# Patient Record
Sex: FEMALE | Race: BLACK OR AFRICAN AMERICAN | Employment: FULL TIME | URBAN - METROPOLITAN AREA
[De-identification: names, ages, dates, MRNs, and addresses within clinical notes are randomized per-mention and may not be internally consistent; named-entity substitution may affect disease eponyms.]

---

## 2017-01-05 ENCOUNTER — HOSPITAL ENCOUNTER (OUTPATIENT)
Dept: ULTRASOUND IMAGING | Age: 47
Discharge: HOME | End: 2017-01-05
Attending: NURSE PRACTITIONER
Payer: SELF-PAY

## 2017-01-05 ENCOUNTER — OFFICE VISIT (OUTPATIENT)
Dept: FAMILY PLANNING/WOMEN'S HEALTH CLINIC | Age: 47
End: 2017-01-05

## 2017-01-05 ENCOUNTER — HOSPITAL ENCOUNTER (OUTPATIENT)
Dept: MAMMOGRAPHY | Age: 47
Discharge: HOME | End: 2017-01-05
Attending: NURSE PRACTITIONER
Payer: SELF-PAY

## 2017-01-05 VITALS — SYSTOLIC BLOOD PRESSURE: 142 MMHG | DIASTOLIC BLOOD PRESSURE: 81 MMHG

## 2017-01-05 DIAGNOSIS — Z12.31 VISIT FOR SCREENING MAMMOGRAM: ICD-10-CM

## 2017-01-05 DIAGNOSIS — N63.20 LEFT BREAST MASS: ICD-10-CM

## 2017-01-05 DIAGNOSIS — Z12.31 ENCOUNTER FOR SCREENING MAMMOGRAM FOR BREAST CANCER: Primary | ICD-10-CM

## 2017-01-05 PROCEDURE — 76642 ULTRASOUND BREAST LIMITED: CPT

## 2017-01-05 PROCEDURE — 77066 DX MAMMO INCL CAD BI: CPT

## 2017-01-05 NOTE — PROGRESS NOTES
EVERY WOMANS LIFE HISTORY QUESTIONNAIRE       No Yes Comments   Has a doctor ever seen or felt anything wrong with your breast? []                                  [x]                                  Seen in ER recently ([facility]) and felt area around reduction scars   Have you ever had a breast biopsy? [x]                                  []                                          When and where was last mammogram performed? 2013 at [facility]    Have you ever been told that there was a problem on your mammogram?   No Yes Comments   [x]                                  []                                       Do you have breast implants? No Yes Comments   [x]                                  []                                  Has had reduction     When was your last Pap test performed? 2013    Have you ever been diagnosed with any type of Cancer   No Yes Comments (type,when,where,type of treatment   []                                  [x]                                  Cervical--had hysterectomy with chemo and radiation  2010        Has a family member been diagnosed with breast or ovarian cancer? No Yes Comments (which family members, and type   [x]                                  []                                         Have you been through menopause? No Yes Date of LMP   []                                  [x]                                  S/p hysterectomy     Are you taking hormone replacement therapy (HRT)     No Yes Comments   [x]                                  []                                       How many times have you been pregnant? 4    Number of live births ? 4  Are you experiencing any of the following? No Yes Comments   Nipple Discharge [x]                                  []                                     Breast Lump/Masses []                                  [x]                                  ?? Area around L.  Breast reduction scars   Breast Skin Changes [x] []                                         Any other health problems? Was having chest pain, did cardiac workup recently that was nl    List of Pap Providers given to pt?     Yes, also gave names of Dr. Tim Pike and Dr. Hermon Hodgkins for [facility]

## 2017-01-08 PROBLEM — Z12.31 ENCOUNTER FOR SCREENING MAMMOGRAM FOR BREAST CANCER: Status: ACTIVE | Noted: 2017-01-08

## 2018-02-09 ENCOUNTER — OFFICE VISIT (OUTPATIENT)
Dept: FAMILY PLANNING/WOMEN'S HEALTH CLINIC | Age: 48
End: 2018-02-09

## 2018-02-09 ENCOUNTER — HOSPITAL ENCOUNTER (OUTPATIENT)
Dept: MAMMOGRAPHY | Age: 48
Discharge: HOME | End: 2018-02-09

## 2018-02-09 ENCOUNTER — HOSPITAL ENCOUNTER (OUTPATIENT)
Dept: LAB | Age: 48
Discharge: HOME | End: 2018-02-09

## 2018-02-09 VITALS — SYSTOLIC BLOOD PRESSURE: 129 MMHG | DIASTOLIC BLOOD PRESSURE: 79 MMHG

## 2018-02-09 DIAGNOSIS — Z01.419 WELL WOMAN EXAM WITH ROUTINE GYNECOLOGICAL EXAM: Primary | ICD-10-CM

## 2018-02-09 DIAGNOSIS — Z85.41 HISTORY OF CERVICAL CANCER IN ADULTHOOD: ICD-10-CM

## 2018-02-09 DIAGNOSIS — Z12.39 SCREENING BREAST EXAMINATION: ICD-10-CM

## 2018-02-09 PROCEDURE — 88175 CYTOPATH C/V AUTO FLUID REDO: CPT | Performed by: PHYSICIAN ASSISTANT

## 2018-02-09 PROCEDURE — 77067 SCR MAMMO BI INCL CAD: CPT

## 2018-02-09 NOTE — PROGRESS NOTES
EVERY WOMANS LIFE HISTORY QUESTIONNAIRE       No Yes Comments   Has a doctor ever seen or felt anything wrong with your breast? [x]                                  []                                     Have you ever had a breast biopsy? [x]                                  []                                          When and where was last mammogram performed? 1/2017    Have you ever been told that there was a problem on your mammogram?   No Yes Comments   [x]                                  []                                       Do you have breast implants? No Yes Comments   [x]                                  []                                       When was your last Pap test performed? 2010    Have you ever been diagnosed with any type of Cancer   No Yes Comments (type,when,where,type of treatment   []                                  [x]                                  Cervical 2010        Has a family member been diagnosed with breast or ovarian cancer? No Yes Comments (which family members, and type   []                                  [x]                                  Aunt and grandmother - breast- paternal       Have you been through menopause? No Yes Date of LMP   []                                  [x]                                  2010     Are you taking hormone replacement therapy (HRT)     No Yes Comments   [x]                                  []                                       How many times have you been pregnant? 5     Number of live births ? 4    Are you experiencing any of the following? No Yes Comments   Nipple Discharge [x]                                  []                                     Breast Lump/Masses [x]                                  []                                     Breast Skin Changes [x]                                  []                                         Any other health problems?  none      List of Pap Providers given to pt? n/a

## 2018-02-09 NOTE — PROGRESS NOTES
Assessment/Plan:    Diagnoses and all orders for this visit:    1. Well woman exam with routine gynecological exam  -     PAP IG, RFX APTIMA HPV ASCUS ([id number]))    2. History of cervical cancer in adulthood        Follow-up Disposition: Not on File    [facility] understanding of this plan. An AVS was printed and given to the patient.      ----------------------------------------------------------------------    Chief Complaint   Patient presents with    Well Woman     EWL visit       History of Present Illness:   all   Here for women's health exam  Last pelvic exam was during her kidney operation several years ago- was told that all looked well (had a kidney removed)  Does not think that she has ever had a pap test since her hysterectomy in  for cervical cancer. The details of the cancer are not well known but she did have chemotherapy after the surgery. She had had a history of abnl pap tests but always the colposcopy testing was reassuring. She eventually developed abnormal vaginal bleeding which led to the diagnosis of cervical cancer and within a week of the dx she had the hysterectomy    She is not having any vaginal complaints  She is in a safe stable long term relationship  She had breast reduction surgery and she developed an infection in her left breast that has left her left breast with a deformity- on exam it appears that there is a retraction present but she assures me that this is due to the infection        No past medical history on file.         No Known Allergies    Social History   Substance Use Topics    Smoking status: Never Smoker    Smokeless tobacco: Never Used    Alcohol use None       Family History   Problem Relation Age of Onset    Breast Cancer Paternal Grandmother 61    Breast Cancer Paternal Aunt 48       Physical Exam:     Visit Vitals    /79    LMP Comment:        A&Ox3  WDWN NAD  Respirations normal and non labored  Breasts right neg for dimpling, skin color changes, retractions or masses  Left breast has deformity to the left and superior of nipple with skin inversion. Otherwise her breast has no masses, skin color changes, dimpling or tenderness  Pelvic exam- ext neg for lesions or discharge. Vaginal cuff intact no cervix. Uterus is surgically absent.  Adnexa are not felt,  body habitus proves challenging

## 2018-02-14 ENCOUNTER — DOCUMENTATION ONLY (OUTPATIENT)
Dept: FAMILY PLANNING/WOMEN'S HEALTH CLINIC | Age: 48
End: 2018-02-14

## 2019-06-05 ENCOUNTER — OP HISTORICAL/CONVERTED ENCOUNTER (OUTPATIENT)
Dept: OTHER | Age: 49
End: 2019-06-05

## 2019-09-25 PROBLEM — Z12.31 ENCOUNTER FOR SCREENING MAMMOGRAM FOR BREAST CANCER: Status: RESOLVED | Noted: 2017-01-08 | Resolved: 2019-09-25

## 2020-06-22 ENCOUNTER — OP HISTORICAL/CONVERTED ENCOUNTER (OUTPATIENT)
Dept: OTHER | Age: 50
End: 2020-06-22

## 2020-06-22 LAB — MAMMOGRAPHY, EXTERNAL: NORMAL

## 2020-06-23 ENCOUNTER — OP HISTORICAL/CONVERTED ENCOUNTER (OUTPATIENT)
Dept: OTHER | Age: 50
End: 2020-06-23

## 2020-06-23 LAB — C-REACTIVE PROTEIN, EXTERNAL: NORMAL

## 2020-06-25 LAB
A/G RATIO, EXTERNAL: 1.3
A/G RATIO, EXTERNAL: 1.3
ALBUMIN, EXTERNAL: 4
ALBUMIN, EXTERNAL: 4
ALK PHOS, EXTERNAL: 52
ALK PHOS, EXTERNAL: 52
ANION GAP BLD CALC-SCNC: NORMAL MMOL/L
ANION GAP BLD CALC-SCNC: NORMAL MMOL/L
BILI DIRECT, EXTERNAL: NORMAL
BILI DIRECT, EXTERNAL: NORMAL
BILI TOTAL, EXTERNAL: 0.8
BILI TOTAL, EXTERNAL: 0.8
BUN BLD-MCNC: 8 MG/DL
BUN BLD-MCNC: 8 MG/DL
BUN/CREATININE RATIO, EXTERNAL: 8
BUN/CREATININE RATIO, EXTERNAL: 8
CALCIUM, EXTERNAL: 9.8
CALCIUM, EXTERNAL: 9.8
CALCIUM, ION, EXTERNAL: NORMAL
CALCIUM, ION, EXTERNAL: NORMAL
CHLORIDE, EXTERNAL: 104
CHLORIDE, EXTERNAL: 104
CO2, EXTERNAL: 23
CO2, EXTERNAL: 23
CREATININE SER, EXTERNAL: 0.97
CREATININE SER, EXTERNAL: 0.97
EGFR IF AFA, EXTERNAL: 80
EGFR IF AFA, EXTERNAL: 80
EGFR NOT AFA, EXTERNAL: 69
EGFR NOT AFA, EXTERNAL: 69
GLOBULIN, EXTERNAL: 3
GLOBULIN, EXTERNAL: 3
GLUCOSE SER, EXTERNAL: 89
GLUCOSE SER, EXTERNAL: 89
POTASSIUM, EXTERNAL: 4
POTASSIUM, EXTERNAL: 4.3
PROTEIN TOT, EXTERNAL: 7
PROTEIN TOT, EXTERNAL: 7
SGOT (AST), EXTERNAL: 15
SGOT (AST), EXTERNAL: 15
SGPT (ALT), EXTERNAL: 16
SGPT (ALT), EXTERNAL: 16
SODIUM, EXTERNAL: 136
SODIUM, EXTERNAL: 136

## 2020-08-05 VITALS
HEART RATE: 84 BPM | WEIGHT: 249 LBS | OXYGEN SATURATION: 96 % | HEIGHT: 65 IN | TEMPERATURE: 97.9 F | SYSTOLIC BLOOD PRESSURE: 133 MMHG | BODY MASS INDEX: 41.48 KG/M2 | DIASTOLIC BLOOD PRESSURE: 77 MMHG

## 2020-08-05 PROBLEM — C53.9 MALIGNANT TUMOR OF CERVIX (HCC): Status: ACTIVE | Noted: 2020-08-05

## 2020-08-05 PROBLEM — E04.9 GOITER: Status: ACTIVE | Noted: 2020-08-05

## 2020-08-05 PROBLEM — G47.00 INSOMNIA: Status: ACTIVE | Noted: 2020-08-05

## 2020-08-05 RX ORDER — PHENTERMINE HYDROCHLORIDE 37.5 MG/1
37.5 TABLET ORAL
COMMUNITY
End: 2021-01-07 | Stop reason: SDUPTHER

## 2020-08-05 RX ORDER — ALBUTEROL SULFATE 90 UG/1
2 AEROSOL, METERED RESPIRATORY (INHALATION)
COMMUNITY

## 2020-08-07 ENCOUNTER — VIRTUAL VISIT (OUTPATIENT)
Dept: FAMILY MEDICINE CLINIC | Age: 50
End: 2020-08-07
Payer: COMMERCIAL

## 2020-08-07 DIAGNOSIS — J06.9 UPPER RESPIRATORY TRACT INFECTION, UNSPECIFIED TYPE: ICD-10-CM

## 2020-08-07 DIAGNOSIS — L04.9 LYMPHADENITIS, ACUTE: Primary | ICD-10-CM

## 2020-08-07 PROCEDURE — 99213 OFFICE O/P EST LOW 20 MIN: CPT | Performed by: FAMILY MEDICINE

## 2020-08-07 RX ORDER — AMOXICILLIN AND CLAVULANATE POTASSIUM 875; 125 MG/1; MG/1
1 TABLET, FILM COATED ORAL EVERY 12 HOURS
Qty: 20 TAB | Refills: 0 | Status: SHIPPED | OUTPATIENT
Start: 2020-08-07 | End: 2020-08-17

## 2020-08-07 NOTE — PATIENT INSTRUCTIONS
General Health and concerns: HEART HEALTHY DIET: 
A heart healthy diet is one that is low in cholesterol (less than 300 mg daily), fat (less than 80 g daily) . You should also minimize carbohydrates / sugars (less amounts of breads, pastas, potato and potato products and sugary foods/snacks, cookies, cakes, etc) . Try to eat whole wheat/multigrain breads and pastas and eat more vegetables. Cook with olive oil (or no oil) and grill, bake, broil or boil foods. Less red meat and more chicken , fish and lean cuts of beef (limited). 7549-0209 calories per day is sufficient 5103-8136 is acceptable for weight loss. EXERCISE: 
You should do exercise 3-5 days per week (minimum) to include increasing your heart rate for 30 to 45 minutes. At least a pace of a brisk walk should do that. This build up your heart and lung endurance and muscles and helps many function of the body. OTHER: 
    Routine Health maintenance: You need to get a yearly follow up/physical exam to review, discuss age and gender appropriate exams, labs, vaccines and screening tests. This includes cardiovascular health risk, cancer screens and other bettina related topics. Medications-take all medications as directed. Please do not stop unless you talk to your doctor or health care provider first. Report any problems immediately. Referrals: if you have been given a referral, please call the office if you do not hear from provider in one week. You may make the appointment yourself. Please keep all appointments with specialists and ask them to send their notes, thoughts, recommendations to us , as your PCP. Imaging/Labs:  Be sure to get these images in a timely manner. IF your test must be scheduled, let us know if you need help getting this done and if you do not hear from that provider in a week , call us or them.   BE SURE to call the office if you do not hear regarding the results in one week after the test is performed Image or lab). It is our intention to inform you of the results ALWAYS, even if normal you should get a notification (Call, portal message). PLEASE mary if you do not get the results. PLEASE follow all recommendations and call/come in /ask questions if you do not understand of if problems develop after or in between visits. Failure to comply with recommended health care advise could result in serious health consequences. Thank you for choosing our practice and please let us know how we can help you feel better and stay well!

## 2020-08-17 VITALS
DIASTOLIC BLOOD PRESSURE: 77 MMHG | SYSTOLIC BLOOD PRESSURE: 133 MMHG | OXYGEN SATURATION: 96 % | TEMPERATURE: 97.9 F | BODY MASS INDEX: 41.48 KG/M2 | HEIGHT: 65 IN | WEIGHT: 249 LBS | HEART RATE: 84 BPM

## 2021-01-07 DIAGNOSIS — E66.09 CLASS 1 OBESITY DUE TO EXCESS CALORIES WITH SERIOUS COMORBIDITY AND BODY MASS INDEX (BMI) OF 30.0 TO 30.9 IN ADULT: Primary | ICD-10-CM

## 2021-01-11 RX ORDER — PHENTERMINE HYDROCHLORIDE 37.5 MG/1
37.5 TABLET ORAL
Qty: 30 TAB | Refills: 3 | Status: SHIPPED | OUTPATIENT
Start: 2021-01-11 | End: 2021-07-28 | Stop reason: ALTCHOICE

## 2021-06-28 ENCOUNTER — TRANSCRIBE ORDER (OUTPATIENT)
Dept: SCHEDULING | Age: 51
End: 2021-06-28

## 2021-06-28 DIAGNOSIS — Z12.31 VISIT FOR SCREENING MAMMOGRAM: Primary | ICD-10-CM

## 2021-06-30 ENCOUNTER — HOSPITAL ENCOUNTER (OUTPATIENT)
Dept: MAMMOGRAPHY | Age: 51
Discharge: HOME | End: 2021-06-30
Payer: COMMERCIAL

## 2021-06-30 DIAGNOSIS — Z12.31 VISIT FOR SCREENING MAMMOGRAM: ICD-10-CM

## 2021-06-30 PROCEDURE — 77067 SCR MAMMO BI INCL CAD: CPT

## 2021-07-28 ENCOUNTER — OFFICE VISIT (OUTPATIENT)
Dept: FAMILY MEDICINE CLINIC | Age: 51
End: 2021-07-28
Payer: COMMERCIAL

## 2021-07-28 VITALS
WEIGHT: 242 LBS | OXYGEN SATURATION: 96 % | HEART RATE: 72 BPM | DIASTOLIC BLOOD PRESSURE: 93 MMHG | BODY MASS INDEX: 40.32 KG/M2 | SYSTOLIC BLOOD PRESSURE: 142 MMHG | TEMPERATURE: 97.1 F | HEIGHT: 65 IN

## 2021-07-28 DIAGNOSIS — Z00.00 ENCOUNTER FOR ROUTINE ADULT HEALTH EXAMINATION WITHOUT ABNORMAL FINDINGS: Primary | ICD-10-CM

## 2021-07-28 DIAGNOSIS — Z12.11 SCREENING FOR COLON CANCER: ICD-10-CM

## 2021-07-28 DIAGNOSIS — E04.9 GOITER: ICD-10-CM

## 2021-07-28 PROCEDURE — 99396 PREV VISIT EST AGE 40-64: CPT | Performed by: FAMILY MEDICINE

## 2021-07-28 RX ORDER — METHOTREXATE 2.5 MG/1
TABLET ORAL
COMMUNITY
Start: 2021-05-14 | End: 2021-08-27 | Stop reason: ALTCHOICE

## 2021-07-28 RX ORDER — FOLIC ACID 1 MG/1
TABLET ORAL
COMMUNITY
Start: 2021-06-24 | End: 2021-08-27 | Stop reason: ALTCHOICE

## 2021-07-28 NOTE — PROGRESS NOTES
IDENTIFYING INFORMATION:      Fabi Cruz , 54 y.o., female  [address] [facility],  [address] [place] [address]     Medical Record Number: [id number]          CHIEF COMPLAINT:     Chief Complaint   Patient presents with    Physical     states that she is here for a check up. HISTORY OF PRESENT ILLNESS:    Ariel Bourgeois is a 54 y.o. female  has a past medical history of Goiter (8/5/2020), Insomnia (8/5/2020), and Malignant tumor of cervix ([facility] [address] (8/5/2020). .  she comes in for follow-up yearly. She has a history of a goiter and has no swallowing issues. She has some arthritis and has been seeing rheumatology. She takes methotrexate and folic acid now. She has no major problems such as fatigue hematochezia or melena. She complains of no chest pain, short of breath, orthopnea or PND. She still occasionally has headaches. She has had a mammogram recently and it was normal.  She sees gynecology for her Pap smear testing. She does need some routine labs and will consider getting a colonoscopy because of her age. There is no personal family history of colon cancer. PAST MEDICAL HISTORY:     Past Medical History:   Diagnosis Date    Goiter 8/5/2020    Insomnia 8/5/2020    Malignant tumor of cervix ([facility] [address] 8/5/2020       MEDICATIONS:     Current Outpatient Medications on File Prior to Visit   Medication Sig Dispense Refill    methotrexate (RHEUMATREX) 2.5 mg tablet TAKE 6 TABLETS BY MOUTH 1 TIME A WEEK      folic acid (FOLVITE) 1 mg tablet TAKE 1 TABLET BY MOUTH EVERY DAY      albuterol (PROVENTIL HFA, VENTOLIN HFA, PROAIR HFA) 90 mcg/actuation inhaler Take 2 Puffs by inhalation every four (4) hours as needed for Wheezing.  [DISCONTINUED] phentermine (ADIPEX-P) 37.5 mg tablet Take 1 Tab by mouth every morning. Max Daily Amount: 37.5 mg. (Patient not taking: Reported on 7/28/2021) 30 Tab 3    [DISCONTINUED] fluticasone prp-sod. chl,bicarb 50 mcg- 0.9 % ksps 2 Sprays by Nasal route. (Patient not taking: Reported on 7/28/2021)       No current facility-administered medications on file prior to visit. ALLERGIES:    No Known Allergies      SOCIAL HISTORY:     Social History     Tobacco Use    Smoking status: Never Smoker    Smokeless tobacco: Never Used   Substance Use Topics    Alcohol use: Not Currently    Drug use: Not on file       SURGICAL HISTORY:    Past Surgical History:   Procedure Laterality Date    HX BREAST REDUCTION Bilateral 2006    HX HYSTERECTOMY      HX NEPHRECTOMY Right 2012        FAMILY HISTORY:    Family History   Problem Relation Age of Onset    Breast Cancer Paternal Grandmother 61    Cancer Paternal Grandmother         breast    Breast Cancer Paternal Aunt 48    Hypertension Mother     Hypertension Father     Diabetes Sister     Cancer Maternal Grandmother         lung         REVIEW OF SYSTEMS:    I personally collected this information from all available source present (patient/others in room and records available) -[email or web address]    Review of Systems   Constitutional: Negative for chills, diaphoresis, fever and weight loss. HENT: Positive for tinnitus. Negative for congestion, ear pain, hearing loss, nosebleeds, sinus pain and sore throat. Eyes: Negative for blurred vision, double vision, photophobia and pain. Respiratory: Negative for cough, sputum production and shortness of breath. Cardiovascular: Negative for chest pain, palpitations, orthopnea, claudication, leg swelling and PND. Gastrointestinal: Negative for abdominal pain, blood in stool, constipation, diarrhea, heartburn, melena, nausea and vomiting. Genitourinary: Negative for dysuria, frequency and urgency. Musculoskeletal: Negative for back pain, joint pain, myalgias and neck pain. Skin: Negative for itching and rash. Neurological: Positive for dizziness. Negative for tingling, sensory change, focal weakness and headaches.    Psychiatric/Behavioral: Negative for depression and suicidal ideas. The patient is not nervous/anxious and does not have insomnia. PHYSICAL EXAMINATION:    Vital Signs:    Visit Vitals  BP (!) 142/93 (BP 1 Location: Left upper arm, BP Patient Position: Sitting)   Pulse 72   Temp 97.1 °F (36.2 °C) (Temporal)   Ht 5' 5\" (1.651 m)   Wt 242 lb (109.8 kg)   SpO2 96%   BMI 40.27 kg/m²         Wt Readings from Last 3 Encounters:   07/28/21 242 lb (109.8 kg)   11/26/19 249 lb (112.9 kg)   11/26/19 249 lb (112.9 kg)     BP Readings from Last 3 Encounters:   07/28/21 (!) 142/93   11/26/19 133/77   11/26/19 133/77         Physical Exam  Nursing note reviewed. Constitutional:       General: She is not in acute distress. Appearance: Normal appearance. She is obese. She is not ill-appearing or toxic-appearing. HENT:      Right Ear: Tympanic membrane, ear canal and external ear normal.      Left Ear: Tympanic membrane, ear canal and external ear normal.      Nose: No congestion or rhinorrhea. Mouth/Throat:      Pharynx: No oropharyngeal exudate or posterior oropharyngeal erythema. Eyes:      General: No scleral icterus. Extraocular Movements: Extraocular movements intact. Conjunctiva/sclera: Conjunctivae normal.      Pupils: Pupils are equal, round, and reactive to light. Neck:      Vascular: No carotid bruit. Cardiovascular:      Rate and Rhythm: Normal rate and regular rhythm. Heart sounds: No murmur heard. No friction rub. No gallop. Pulmonary:      Effort: Pulmonary effort is normal.      Breath sounds: Normal breath sounds. No wheezing, rhonchi or rales. Abdominal:      General: Bowel sounds are normal. There is no distension. Palpations: Abdomen is soft. There is no mass. Tenderness: There is no abdominal tenderness. Musculoskeletal:         General: No swelling, tenderness or deformity. Cervical back: No rigidity. No muscular tenderness. Right lower leg: No edema. Left lower leg: No edema. Lymphadenopathy:      Cervical: No cervical adenopathy. Skin:     Capillary Refill: Capillary refill takes less than 2 seconds. Coloration: Skin is not jaundiced. Findings: No erythema or rash. Neurological:      General: No focal deficit present. Mental Status: She is alert and oriented to person, place, and time. Mental status is at baseline. Psychiatric:         Mood and Affect: Mood normal.         Behavior: Behavior normal.         Thought Content: Thought content normal.         Judgment: Judgment normal.           ASSESSMENT/PLAN:      Discussion (regarding today's visit with Jaja Cueto); The patient and I discussed all age and gender appropriate screening exams. Risk of non compliance discussed including missing early, treatable and possibly curable serious health issues. Labs, imaging and vaccinations as well as any other pertinent testing was ordered if appropriate. ICD-10-CM ICD-9-CM    1. Encounter for routine adult health examination without abnormal findings  Z00.00 V70.0 CBC WITH AUTOMATED DIFF      LIPID PANEL      METABOLIC PANEL, COMPREHENSIVE      URINALYSIS W/ RFLX MICROSCOPIC   2. Goiter  E04.9 240.9 TSH 3RD GENERATION   3. Screening for colon cancer  Z12.11 V76.51 REFERRAL TO GASTROENTEROLOGY      WE reviewed each diagnosis listed for today's visit.  WE reviewed medications, treatment, testing such as labs, imagine, referrals and when to call regarding results and appointments.  Reminded patient to keep any and all appointments with specialists, labs, imaging.  Reminded patient to make sure we get copies of any specialists care, labs and imaging.  Reminded patient to call of come by the office if there are any concerns, questions , comments or problems.  The patient verbalized understanding of the care plan and all questions were answered to the patient's satisfaction prior to leaving the office.     The patient was told that failure to comply with recommended testing could result in abnormal health consequences.  The patient was instructed to have yearly routine health maintenance including but not limited to age appropriate vaccines, testing, screening exams.  ALL questions were answered to her satisfaction before leaving the office. The patient actively participated in medical decision making. FOLLOW UP:     Patient knows to keep any and all future visits scheduled unless told otherwise. Patient knows to call, come back if any concerns, questions, comments or problems arise. Follow-up and Dispositions    · Return in about 4 months (around 11/28/2021) for follow up htn. Timothy Pham DO    This visit was reviewed and signed electronically. It was been completed with voice recognition software and hand typing. It may have syntax and spelling errors despite editing.

## 2021-07-28 NOTE — PATIENT INSTRUCTIONS
General Health and concerns:  HEART HEALTHY DIET:  A heart healthy diet is one that is low in cholesterol (less than 300 mg daily), fat (less than 80 g daily) . You should also minimize carbohydrates / sugars (less amounts of breads, pastas, potato and potato products and sugary foods/snacks, cookies, cakes, etc) . Try to eat whole wheat/multigrain breads and pastas and eat more vegetables. Cook with olive oil (or no oil) and grill, bake, broil or boil foods. Less red meat and more chicken , fish and lean cuts of beef (limited). 2663-0518 calories per day is sufficient 9717-2977 is acceptable for weight loss. EXERCISE:  You should do exercise 3-5 days per week (minimum) to include increasing your heart rate for 30 to 45 minutes. At least a pace of a brisk walk should do that. This build up your heart and lung endurance and muscles and helps many function of the body. OTHER:        Routine Health maintenance: You need to get a yearly follow up/physical exam to review, discuss age and gender appropriate exams, labs, vaccines and screening tests. This includes cardiovascular health risk, cancer screens and other bettina related topics. Medications-Take all medications as directed. Please do not stop unless you talk to your doctor or health care provider first. Report any problems immediately. Referrals: if you have been given a referral, please call the office if you do not hear from provider in one week. You may make the appointment yourself. Please keep all appointments with specialists and ask them to send their notes, thoughts, recommendations to us , as your PCP. Imaging/Labs:  Be sure to get these images in a timely manner. IF your test must be scheduled, let us know if you need help getting this done and if you do not hear from that provider in a week , call us or them.   BE SURE to call the office if you do not hear regarding the results in one week after the test is performed Image or lab). It is our intention to inform you of the results ALWAYS, even if normal you should get a notification (Call, portal message). PLEASE mary if you do not get the results. PLEASE follow all recommendations and call/come in /ask questions if you do not understand of if problems develop after or in between visits. Failure to comply with recommended health care advise could result in serious health consequences. Thank you for choosing our practice and please let us know how we can help you feel better and stay well!

## 2021-07-28 NOTE — PROGRESS NOTES
1. Have you been to the ER, urgent care clinic since your last visit? Hospitalized since your last visit? No    2. Have you seen or consulted any other health care providers outside of the [address] [place] [address] since your last visit? Include any pap smears or colon screening. Rheumatologist at [facility]    Chief Complaint   Patient presents with   [facility] Physical     states that she is here for a check up.       Visit Vitals  BP (!) 152/96 (BP 1 Location: Left upper arm, BP Patient Position: Sitting)   Pulse 79   Temp 97.1 °F (36.2 °C) (Temporal)   Ht 5' 5\" (1.651 m)   Wt 242 lb (109.8 kg)   SpO2 96%   BMI 40.27 kg/m²

## 2021-07-31 LAB
ALBUMIN SERPL-MCNC: 4.4 G/DL (ref 3.8–4.8)
ALBUMIN/GLOB SERPL: 1.5 {RATIO} (ref 1.2–2.2)
ALP SERPL-CCNC: 67 IU/L (ref 48–121)
ALT SERPL-CCNC: 14 IU/L (ref 0–32)
APPEARANCE UR: CLEAR
AST SERPL-CCNC: 13 IU/L (ref 0–40)
BASOPHILS # BLD AUTO: 0 X10E3/UL (ref 0–0.2)
BASOPHILS NFR BLD AUTO: 1 %
BILIRUB SERPL-MCNC: 0.4 MG/DL (ref 0–1.2)
BILIRUB UR QL STRIP: NEGATIVE
BUN SERPL-MCNC: 10 MG/DL (ref 6–24)
BUN/CREAT SERPL: 10 (ref 9–23)
CALCIUM SERPL-MCNC: 10.2 MG/DL (ref 8.7–10.2)
CHLORIDE SERPL-SCNC: 105 MMOL/L (ref 96–106)
CHOLEST SERPL-MCNC: 198 MG/DL (ref 100–199)
CO2 SERPL-SCNC: 22 MMOL/L (ref 20–29)
COLOR UR: YELLOW
CREAT SERPL-MCNC: 0.97 MG/DL (ref 0.57–1)
EOSINOPHIL # BLD AUTO: 0.1 X10E3/UL (ref 0–0.4)
EOSINOPHIL NFR BLD AUTO: 2 %
ERYTHROCYTE [DISTWIDTH] IN BLOOD BY AUTOMATED COUNT: 14.3 % (ref 11.7–15.4)
GLOBULIN SER CALC-MCNC: 2.9 G/DL (ref 1.5–4.5)
GLUCOSE SERPL-MCNC: 87 MG/DL (ref 65–99)
GLUCOSE UR QL: NEGATIVE
HCT VFR BLD AUTO: 43.9 % (ref 34–46.6)
HDLC SERPL-MCNC: 51 MG/DL
HGB BLD-MCNC: 14.8 G/DL (ref 11.1–15.9)
HGB UR QL STRIP: NEGATIVE
IMM GRANULOCYTES # BLD AUTO: 0 X10E3/UL (ref 0–0.1)
IMM GRANULOCYTES NFR BLD AUTO: 0 %
KETONES UR QL STRIP: NEGATIVE
LDLC SERPL CALC-MCNC: 114 MG/DL (ref 0–99)
LEUKOCYTE ESTERASE UR QL STRIP: NEGATIVE
LYMPHOCYTES # BLD AUTO: 2.2 X10E3/UL (ref 0.7–3.1)
LYMPHOCYTES NFR BLD AUTO: 40 %
MCH RBC QN AUTO: 31.3 PG (ref 26.6–33)
MCHC RBC AUTO-ENTMCNC: 33.7 G/DL (ref 31.5–35.7)
MCV RBC AUTO: 93 FL (ref 79–97)
MICRO URNS: NORMAL
MONOCYTES # BLD AUTO: 0.4 X10E3/UL (ref 0.1–0.9)
MONOCYTES NFR BLD AUTO: 6 %
NEUTROPHILS # BLD AUTO: 2.9 X10E3/UL (ref 1.4–7)
NEUTROPHILS NFR BLD AUTO: 51 %
NITRITE UR QL STRIP: NEGATIVE
PH UR STRIP: 5.5 [PH] (ref 5–7.5)
PLATELET # BLD AUTO: 277 X10E3/UL (ref 150–450)
POTASSIUM SERPL-SCNC: 4.2 MMOL/L (ref 3.5–5.2)
PROT SERPL-MCNC: 7.3 G/DL (ref 6–8.5)
PROT UR QL STRIP: NEGATIVE
RBC # BLD AUTO: 4.73 X10E6/UL (ref 3.77–5.28)
SODIUM SERPL-SCNC: 140 MMOL/L (ref 134–144)
SP GR UR: 1.02 (ref 1–1.03)
TRIGL SERPL-MCNC: 187 MG/DL (ref 0–149)
TSH SERPL DL<=0.005 MIU/L-ACNC: 0.27 UIU/ML (ref 0.45–4.5)
UROBILINOGEN UR STRIP-MCNC: 1 MG/DL (ref 0.2–1)
VLDLC SERPL CALC-MCNC: 33 MG/DL (ref 5–40)
WBC # BLD AUTO: 5.6 X10E3/UL (ref 3.4–10.8)

## 2021-08-02 NOTE — PROGRESS NOTES
Blood count is normal.  The thyroid is indicating his hypothyroidism. Therefore, I would see an endocrinologist.  I am not sure if that is related to the methotrexate or not. However, it is significant enough to not be ignored. Cholesterol is okay. The total is 198 which is good the LDL or bad cholesterol is 114. It should be less than 100. So as always, watch the amount of carbohydrates such as breads, pastas, potatoes and that the fried, breaded, greasy fatty foods.

## 2021-08-17 DIAGNOSIS — E05.90 HYPERTHYROIDISM: Primary | ICD-10-CM

## 2021-08-17 NOTE — PROGRESS NOTES
Identifying Data:  54 y.o. , Mike Russell , female     HISTORICAL DATA (REVIEWED TODAY):  ALLERGIES-    No Known Allergies    MEDICATION AS OF LAST RECONCILIATION (NOT INCLUDING CHANGES MADE TODAY):    Current Outpatient Medications on File Prior to Visit   Medication Sig Dispense Refill    methotrexate (RHEUMATREX) 2.5 mg tablet TAKE 6 TABLETS BY MOUTH 1 TIME A WEEK      folic acid (FOLVITE) 1 mg tablet TAKE 1 TABLET BY MOUTH EVERY DAY      albuterol (PROVENTIL HFA, VENTOLIN HFA, PROAIR HFA) 90 mcg/actuation inhaler Take 2 Puffs by inhalation every four (4) hours as needed for Wheezing. No current facility-administered medications on file prior to visit. PAST MEDICAL HISTORY:    Patient Active Problem List   Diagnosis Code    Goiter E04.9    Insomnia G47.00    Malignant tumor of cervix ([facility] [address] C53.9    Lymphadenitis, acute L04.9       ASSESSMENT AND PLAN:      ICD-10-CM ICD-9-CM    1.  Hyperthyroidism  E05.90 242.90 REFERRAL TO ENDOCRINOLOGY             Domenica Matthew DO

## 2021-08-27 ENCOUNTER — VIRTUAL VISIT (OUTPATIENT)
Dept: FAMILY MEDICINE CLINIC | Age: 51
End: 2021-08-27
Payer: MEDICAID

## 2021-08-27 DIAGNOSIS — I10 ESSENTIAL HYPERTENSION: ICD-10-CM

## 2021-08-27 DIAGNOSIS — M54.12 CERVICAL RADICULOPATHY: Primary | ICD-10-CM

## 2021-08-27 PROCEDURE — 99214 OFFICE O/P EST MOD 30 MIN: CPT | Performed by: FAMILY MEDICINE

## 2021-08-27 RX ORDER — AMITRIPTYLINE HYDROCHLORIDE 25 MG/1
TABLET, FILM COATED ORAL
Qty: 60 TABLET | Refills: 0 | Status: SHIPPED | OUTPATIENT
Start: 2021-08-27 | End: 2021-10-06

## 2021-08-27 RX ORDER — LOSARTAN POTASSIUM 50 MG/1
50 TABLET ORAL DAILY
Qty: 30 TABLET | Refills: 1 | Status: SHIPPED | OUTPATIENT
Start: 2021-08-27 | End: 2021-10-19

## 2021-08-27 NOTE — PROGRESS NOTES
IDENTIFICATION:  Jude Cruz 54 y.o. female     Medical Record Number: [id number]      This is a video visit performed with doxy. me due to COVID-19 Pandemic. It utilizes video and audio technology that allows real time interaction with the patient. It is documented in [facility] [address]  realizes that this is a billable charge and agrees to proceed. The patient's identity is confirmed. Their location is confirmed to be in the state where provider is licensed. CC (Chief Complaint):    Chief Complaint   Patient presents with    Hypertension         MEDICAL PROBLEM LIST (Past and Current):     Past Medical History:   Diagnosis Date    Goiter 8/5/2020    Insomnia 8/5/2020    Malignant tumor of cervix ([facility] [address] 8/5/2020         HPI(History of the Present Illness):    Terrance England is a 54 y.o. female   has a past medical history of Goiter (8/5/2020), Insomnia (8/5/2020), and Malignant tumor of cervix ([facility] [address] (8/5/2020). .  she asks for a virtual visit today due to elevated blood pressure. She has been to several physicians over the last week or so than her blood pressure is always up. 163/120 this morning. Day prior it was 183 of 114, 152 of 118, 166/103. She denies any headaches. She denies any nausea vomiting. She denies any blurry, double, altered vision no spots, floaters, vertigo, hearing loss. She has no chest pain or palpitations. She has no shortness of breath. She does have neck pain shoulder pain left arm pain and elbow pain. She is seen other providers and been diagnosed with cervical radiculopathy. She does have a history of rheumatoid arthritis also and was on methotrexate. However, she started to get a tremor and her rheumatologist stopped that. She is currently finishing up a Medrol Dosepak on day #6. She says it has not helped. She says she is not able to sleep. She wonders if the pain is causing her pressure to rise.     HISTORICAL DATA (Social, surgical and family histories) :    Social History     Tobacco Use    Smoking status: Never Smoker    Smokeless tobacco: Never Used   Substance Use Topics    Alcohol use: Not Currently    Drug use: Not on file     Past Surgical History:   Procedure Laterality Date    HX BREAST REDUCTION Bilateral 2006    HX HYSTERECTOMY      HX NEPHRECTOMY Right 2012     Family History   Problem Relation Age of Onset    Breast Cancer Paternal Grandmother 61    Cancer Paternal Grandmother         breast    Breast Cancer Paternal Aunt 48    Hypertension Mother     Hypertension Father     Diabetes Sister     Cancer Maternal Grandmother         lung       ALLERGIES AND MEDICATIONS:  Allergies-    No Known Allergies    Medications-    Current Outpatient Medications on File Prior to Visit   Medication Sig Dispense Refill    albuterol (PROVENTIL HFA, VENTOLIN HFA, PROAIR HFA) 90 mcg/actuation inhaler Take 2 Puffs by inhalation every four (4) hours as needed for Wheezing.  [DISCONTINUED] methotrexate (RHEUMATREX) 2.5 mg tablet TAKE 6 TABLETS BY MOUTH 1 TIME A WEEK      [DISCONTINUED] folic acid (FOLVITE) 1 mg tablet TAKE 1 TABLET BY MOUTH EVERY DAY       No current facility-administered medications on file prior to visit. REVIEW OF SYSTEMS (information comes from all available records and patient/family present with patient) :    PER HPI    VITAL SIGNS:    NO vitals signs reported by patient from current location this day. Wt Readings from Last 3 Encounters:   07/28/21 242 lb (109.8 kg)   11/26/19 249 lb (112.9 kg)   11/26/19 249 lb (112.9 kg)       BP Readings from Last 3 Encounters:   07/28/21 (!) 142/93   11/26/19 133/77   11/26/19 133/77         PHYSICAL EXAMINATION:     By  video and virtual technology we observed the following today;    General-she does not look to be in acute distress. She is alert.   She is pleasant and conversational.  Skin-visible areas on arms neck and face show no bruising, bleeding, rashes. Neck-range of motion looks normal.  Trachea looks midline. There are no masses noted. Throat-no raspy or hoarseness of voice or difficulty speaking. No stridor. Lungs-no audible wheezing or visible dyspnea. Neurological-areas of arms shoulders neck face show no focal motor asymmetries tremors or abnormal movements. Psych-mood is pleasant and behavior and appearance are normal.  Musculoskeletal-head neck and shoulders move normally with no gross deformities noted. ASSESSMENT AND PLAN:  Discussion regarding today's visit with Caitlin Cruz :       ICD-10-CM ICD-9-CM    1. Cervical radiculopathy  M54.12 723.4 amitriptyline (ELAVIL) 25 mg tablet   2. Essential hypertension  I10 401.9 losartan (COZAAR) 50 mg tablet     -She definitely has some cervical radiculopathy symptoms  -They seem to be causing her poor sleep.  -I will give her some Elavil at bedtime. She will start 25 mg daily for about 4 days and if needed go up to 50. She may stay on 25 if it helps  -I will start losartan 50 mg in the morning.  -I want to see her in a week to check her pressure and see how she is doing otherwise. Other Discussion-  The patient realizes; That this video visit does not replace nor is as accurate / reliable as a face to face visit with a medical professional/physician/provider and that all diagnoses are based on information given by patient /others representing patient if present and records available. Each diagnosis listed for today's visit including medications, treatment, testing such as labs, imagine, referrals and when to call regarding results and appointments. Reminded patient to keep any and all appointments with specialists, labs, imaging. Reminded patient to make sure we get copies of any specialists care, labs and imaging. Reminded patient to call of come by the office if there are any concerns, questions , comments or problems.     The patient verbalized understanding of the care plan and all questions were answered to the patient's satisfaction prior to leaving the office. The patient was told that failure to comply with recommended testing could result in abnormal health consequences. The patient was instructed to have yearly routine health maintenance including but not limited to age appropriate vaccines, testing, screening exams. The patient actively participated in medical decision making. FOLLOW UP:    Patient is advised to keep all future appointments unless otherwise discussed and keep ALL appointments for other providers, specialists and testing if scheduled. Failure to do so could result in adverse health consequences. Follow-up and Dispositions    · Return in about 1 week (around 9/3/2021) for Follow-up in office, follow-up blood pressure. This visit was completed using manually typed information and voice recognition software. There may be errors despite editing. This visit was completed using doxy. me which is audio and video technology that allows real time interaction with the patient.     Joseline Yung DO

## 2021-08-27 NOTE — PATIENT INSTRUCTIONS
General Health and concerns:  HEART HEALTHY DIET:  A heart healthy diet is one that is low in cholesterol (less than 300 mg daily), fat (less than 80 g daily) . You should also minimize carbohydrates / sugars (less amounts of breads, pastas, potato and potato products and sugary foods/snacks, cookies, cakes, etc) . Try to eat whole wheat/multigrain breads and pastas and eat more vegetables. Cook with olive oil (or no oil) and grill, bake, broil or boil foods. Less red meat and more chicken , fish and lean cuts of beef (limited). 9125-7041 calories per day is sufficient 5209-0806 is acceptable for weight loss. EXERCISE:  You should do exercise 3-5 days per week (minimum) to include increasing your heart rate for 30 to 45 minutes. At least a pace of a brisk walk should do that. This build up your heart and lung endurance and muscles and helps many function of the body. OTHER:        Routine Health maintenance: You need to get a yearly follow up/physical exam to review, discuss age and gender appropriate exams, labs, vaccines and screening tests. This includes cardiovascular health risk, cancer screens and other bettina related topics. Medications-take all medications as directed. Please do not stop unless you talk to your doctor or health care provider first. Report any problems immediately. Referrals: if you have been given a referral, please call the office if you do not hear from provider in one week. You may make the appointment yourself. Please keep all appointments with specialists and ask them to send their notes, thoughts, recommendations to us , as your PCP. Imaging/Labs:  Be sure to get these images in a timely manner. IF your test must be scheduled, let us know if you need help getting this done and if you do not hear from that provider in a week , call us or them.   BE SURE to call the office if you do not hear regarding the results in one week after the test is performed Image or lab). It is our intention to inform you of the results ALWAYS, even if normal you should get a notification (Call, portal message). PLEASE mary if you do not get the results. PLEASE follow all recommendations and call/come in /ask questions if you do not understand of if problems develop after or in between visits. Failure to comply with recommended health care advise could result in serious health consequences. You have had a virtual visit today using technology that allows real-time interaction between you and the physician. It does NOT replace personal, face to face , in person visits with a health care provider. Please note that certain diagnoses and advised comes from knowledge of medical conditions and depends HEAVILY upon information you provide the physician. IF there are any concerns or you do not improve you should be seen in person, face to face by a healthcare provider. Thank you for choosing our practice and please let us know how we can help you feel better and stay well!

## 2021-09-01 ENCOUNTER — OFFICE VISIT (OUTPATIENT)
Dept: FAMILY MEDICINE CLINIC | Age: 51
End: 2021-09-01
Payer: MEDICAID

## 2021-09-01 VITALS
SYSTOLIC BLOOD PRESSURE: 138 MMHG | HEIGHT: 65 IN | DIASTOLIC BLOOD PRESSURE: 83 MMHG | TEMPERATURE: 97.1 F | WEIGHT: 246.4 LBS | BODY MASS INDEX: 41.05 KG/M2 | OXYGEN SATURATION: 97 % | HEART RATE: 92 BPM

## 2021-09-01 DIAGNOSIS — M54.12 CERVICAL RADICULOPATHY: Primary | ICD-10-CM

## 2021-09-01 DIAGNOSIS — I10 ESSENTIAL HYPERTENSION: ICD-10-CM

## 2021-09-01 PROCEDURE — 99213 OFFICE O/P EST LOW 20 MIN: CPT | Performed by: FAMILY MEDICINE

## 2021-09-01 NOTE — PROGRESS NOTES
1. Have you been to the ER, urgent care clinic since your last visit? Hospitalized since your last visit? No    2. Have you seen or consulted any other health care providers outside of the [address] [place] [address] since your last visit? Include any pap smears or colon screening.  No    Chief Complaint   Patient presents with    Hypertension     Visit Vitals  /83 (BP 1 Location: Right upper arm, BP Patient Position: Sitting)   Pulse 92   Temp 97.1 °F (36.2 °C) (Temporal)   Ht 5' 5\" (1.651 m)   Wt 246 lb 6.4 oz (111.8 kg)   SpO2 97%   BMI 41.00 kg/m²

## 2021-09-01 NOTE — PROGRESS NOTES
IDENTIFYING INFORMATION:      Jude Cruz , 54 y.o., female  [address] [place] [address]     Medical Record Number: [id number]          CHIEF COMPLAINT:     Chief Complaint   Patient presents with    Hypertension         HISTORY OF PRESENT ILLNESS:    Terrance England is a 54 y.o. female  has a past medical history of Goiter (8/5/2020), Insomnia (8/5/2020), and Malignant tumor of cervix ([facility] [address] (8/5/2020). .  she comes in for follow up on blood pressure and radiculopathy, started elavil for the latter and arm still hurts. Has seen rheumatology and gotten some ct scheduled due to a knot on neck. Has started losartan also for elevated blood pressure and thinks it is doing ok without side effects or adverse reactions. PAST MEDICAL HISTORY:     Past Medical History:   Diagnosis Date    Goiter 8/5/2020    Insomnia 8/5/2020    Malignant tumor of cervix ([facility] [address] 8/5/2020       MEDICATIONS:     Current Outpatient Medications on File Prior to Visit   Medication Sig Dispense Refill    amitriptyline (ELAVIL) 25 mg tablet One tablet  An hour prior to bed for 4 days then two tablets at bedtime orally 60 Tablet 0    losartan (COZAAR) 50 mg tablet Take 1 Tablet by mouth daily. 30 Tablet 1    albuterol (PROVENTIL HFA, VENTOLIN HFA, PROAIR HFA) 90 mcg/actuation inhaler Take 2 Puffs by inhalation every four (4) hours as needed for Wheezing. No current facility-administered medications on file prior to visit.        ALLERGIES:    No Known Allergies      SOCIAL HISTORY:     Social History     Tobacco Use    Smoking status: Never Smoker    Smokeless tobacco: Never Used   Substance Use Topics    Alcohol use: Not Currently    Drug use: Not on file       SURGICAL HISTORY:    Past Surgical History:   Procedure Laterality Date    HX BREAST REDUCTION Bilateral 2006    HX HYSTERECTOMY      HX NEPHRECTOMY Right 2012        FAMILY HISTORY:    Family History   Problem Relation Age of Onset    Breast Cancer Paternal Grandmother 61    Cancer Paternal Grandmother         breast    Breast Cancer Paternal Aunt 48    Hypertension Mother     Hypertension Father     Diabetes Sister     Cancer Maternal Grandmother         lung         REVIEW OF SYSTEMS:    I personally collected this information from all available source present (patient/others in room and records available) -[email or web address]    Review of Systems   Constitutional: Negative for chills, diaphoresis and fever. HENT: Negative for ear pain, nosebleeds and tinnitus. Eyes: Negative for blurred vision, double vision and photophobia. Respiratory: Negative for cough and shortness of breath. Cardiovascular: Negative for chest pain, palpitations and leg swelling. Gastrointestinal: Negative for abdominal pain, diarrhea, nausea and vomiting. Genitourinary: Negative for dysuria, frequency and urgency. Musculoskeletal: Positive for myalgias and neck pain. Neurological: Positive for tingling and sensory change. Negative for dizziness and headaches. Cervical radiculopathy           PHYSICAL EXAMINATION:    Vital Signs:    Visit Vitals  /83 (BP 1 Location: Right upper arm, BP Patient Position: Sitting)   Pulse 92   Temp 97.1 °F (36.2 °C) (Temporal)   Ht 5' 5\" (1.651 m)   Wt 246 lb 6.4 oz (111.8 kg)   SpO2 97%   BMI 41.00 kg/m²         Wt Readings from Last 3 Encounters:   09/01/21 246 lb 6.4 oz (111.8 kg)   07/28/21 242 lb (109.8 kg)   11/26/19 249 lb (112.9 kg)     BP Readings from Last 3 Encounters:   09/01/21 138/83   07/28/21 (!) 142/93   11/26/19 133/77         Physical Exam  Constitutional:       General: She is not in acute distress. Appearance: Normal appearance. She is obese. She is not ill-appearing. Eyes:      General: No scleral icterus. Conjunctiva/sclera: Conjunctivae normal.      Pupils: Pupils are equal, round, and reactive to light. Cardiovascular:      Rate and Rhythm: Normal rate and regular rhythm. Heart sounds: Normal heart sounds. No murmur heard. No friction rub. No gallop. Pulmonary:      Effort: Pulmonary effort is normal.      Breath sounds: Normal breath sounds. No wheezing, rhonchi or rales. Musculoskeletal:      Cervical back: Tenderness (left C2-3 tight,knot and pain into lower neck) present. Right lower leg: No edema. Left lower leg: No edema. Skin:     Coloration: Skin is not jaundiced. Findings: No bruising or erythema. Neurological:      General: No focal deficit present. Mental Status: She is alert and oriented to person, place, and time. Mental status is at baseline. Motor: No weakness. Gait: Gait normal.           ASSESSMENT/PLAN:      Discussion (regarding today's visit with Drucella Heimlich);  Blood pressure is good on the losartan.  We will continue this.  Cervical radiculopathy the is flaring up.  Continue care with specialist.   Follow-up 3 months for blood pressure recheck and see how she is doing with her other problems. ICD-10-CM ICD-9-CM    1. Cervical radiculopathy  M54.12 723.4    2. Essential hypertension  I10 401.9         WE reviewed medications, treatment, testing such as labs, imagine, referrals and when to call regarding results and appointments.  Reminded patient to keep any and all appointments with specialists, labs, imaging.  Reminded patient to make sure we get copies of any specialists care, labs and imaging.  Reminded patient to call of come by the office if there are any concerns, questions , comments or problems.  The patient verbalized understanding of the care plan and all questions were answered to the patient's satisfaction prior to leaving the office.  The patient was told that failure to comply with recommended testing could result in abnormal health consequences.      The patient was instructed to have yearly routine health maintenance including but not limited to age appropriate vaccines, testing, screening exams.  ALL questions were answered to her satisfaction before leaving the office. The patient actively participated in medical decision making. FOLLOW UP:     Patient knows to keep any and all future visits scheduled unless told otherwise. Patient knows to call, come back if any concerns, questions, comments or problems arise. Follow-up and Dispositions    · Return in about 10 weeks (around 11/10/2021) for follow up blood pressure . Jeremias Loomis DO    This visit was reviewed and signed electronically. It was been completed with voice recognition software and hand typing. It may have syntax and spelling errors despite editing.

## 2021-09-01 NOTE — PATIENT INSTRUCTIONS
General Health and concerns:  HEART HEALTHY DIET:  A heart healthy diet is one that is low in cholesterol (less than 300 mg daily), fat (less than 80 g daily) . You should also minimize carbohydrates / sugars (less amounts of breads, pastas, potato and potato products and sugary foods/snacks, cookies, cakes, etc) . Try to eat whole wheat/multigrain breads and pastas and eat more vegetables. Cook with olive oil (or no oil) and grill, bake, broil or boil foods. Less red meat and more chicken , fish and lean cuts of beef (limited). 3281-3472 calories per day is sufficient 2669-1417 is acceptable for weight loss. EXERCISE:  You should do exercise 3-5 days per week (minimum) to include increasing your heart rate for 30 to 45 minutes. At least a pace of a brisk walk should do that. This build up your heart and lung endurance and muscles and helps many function of the body. OTHER:    IF your condition(s) do not improve, get worse and/or if any concerns arise, please call or come by the office. Routine Health maintenance: You need to get a yearly follow up/physical exam to review, discuss age and gender appropriate exams, labs, vaccines and screening tests. This includes cardiovascular health risk, cancer screens and other bettina related topics. Medications-Take all medications as directed. Please do not stop unless you talk to your doctor or health care provider first. Report any problems immediately. Referrals: if you have been given a referral, please call the office if you do not hear from provider in one week. You may make the appointment yourself. Please keep all appointments with specialists and ask them to send their notes, thoughts, recommendations to us , as your PCP. KEEP all upcoming appointments with our office UNLESS otherwise and specifically told not to.     CHECK your diagnosis/problem list for today and that orders and prescriptions are what we discussed as well as MAKE sure all information is accurate and has been discussed to your satisfaction. PLEASE make sure all your questions have been answered and feel free to call or come back should any concerns arise. Imaging/Labs:  Be sure to get these images in a timely manner. IF your test must be scheduled, let us know if you need help getting this done and if you do not hear from that provider in a week , call us or them. BE SURE to call the office if you do not hear regarding the results in one week after the test is performed Image or lab). It is our intention to inform you of the results ALWAYS, even if normal you should get a notification (Call, portal message). PLEASE mary if you do not get the results. PLEASE follow all recommendations and call/come in /ask questions if you do not understand of if problems develop after or in between visits. Failure to comply with recommended health care advise could result in serious health consequences. Thank you for choosing our practice and please let us know how we can help you feel better and stay well!

## 2021-09-27 ENCOUNTER — HOSPITAL ENCOUNTER (OUTPATIENT)
Dept: PREADMISSION TESTING | Age: 51
Discharge: HOME | End: 2021-09-27
Payer: MEDICAID

## 2021-09-27 LAB — SARS-COV-2, COV2: NORMAL

## 2021-09-27 PROCEDURE — U0005 INFEC AGEN DETEC AMPLI PROBE: HCPCS

## 2021-09-28 LAB
SARS-COV-2, XPLCVT: NOT DETECTED
SOURCE, COVRS: NORMAL

## 2021-10-01 ENCOUNTER — HOSPITAL ENCOUNTER (OUTPATIENT)
Age: 51
Setting detail: OUTPATIENT SURGERY
Discharge: HOME | End: 2021-10-01
Attending: INTERNAL MEDICINE | Admitting: INTERNAL MEDICINE
Payer: MEDICAID

## 2021-10-01 ENCOUNTER — ANESTHESIA EVENT (OUTPATIENT)
Dept: ENDOSCOPY | Age: 51
End: 2021-10-01
Payer: MEDICAID

## 2021-10-01 ENCOUNTER — ANESTHESIA (OUTPATIENT)
Dept: ENDOSCOPY | Age: 51
End: 2021-10-01
Payer: MEDICAID

## 2021-10-01 ENCOUNTER — APPOINTMENT (OUTPATIENT)
Dept: ENDOSCOPY | Age: 51
End: 2021-10-01
Attending: INTERNAL MEDICINE
Payer: MEDICAID

## 2021-10-01 VITALS
WEIGHT: 245 LBS | BODY MASS INDEX: 38.45 KG/M2 | DIASTOLIC BLOOD PRESSURE: 77 MMHG | RESPIRATION RATE: 18 BRPM | HEIGHT: 67 IN | SYSTOLIC BLOOD PRESSURE: 128 MMHG | TEMPERATURE: 97.5 F | HEART RATE: 71 BPM | OXYGEN SATURATION: 100 %

## 2021-10-01 PROCEDURE — 77030019988 HC FCPS ENDOSC DISP BSC -B: Performed by: INTERNAL MEDICINE

## 2021-10-01 PROCEDURE — 76060000032 HC ANESTHESIA 0.5 TO 1 HR: Performed by: INTERNAL MEDICINE

## 2021-10-01 PROCEDURE — 2709999900 HC NON-CHARGEABLE SUPPLY: Performed by: INTERNAL MEDICINE

## 2021-10-01 PROCEDURE — 76040000007: Performed by: INTERNAL MEDICINE

## 2021-10-01 PROCEDURE — 74011250636 HC RX REV CODE- 250/636: Performed by: ANESTHESIOLOGY

## 2021-10-01 RX ORDER — SODIUM CHLORIDE, SODIUM LACTATE, POTASSIUM CHLORIDE, CALCIUM CHLORIDE 600; 310; 30; 20 MG/100ML; MG/100ML; MG/100ML; MG/100ML
75 INJECTION, SOLUTION INTRAVENOUS CONTINUOUS
Status: DISCONTINUED | OUTPATIENT
Start: 2021-10-01 | End: 2021-10-01 | Stop reason: HOSPADM

## 2021-10-01 RX ORDER — PROPOFOL 10 MG/ML
INJECTION, EMULSION INTRAVENOUS AS NEEDED
Status: DISCONTINUED | OUTPATIENT
Start: 2021-10-01 | End: 2021-10-01 | Stop reason: HOSPADM

## 2021-10-01 RX ORDER — METHYLPREDNISOLONE 4 MG/1
TABLET ORAL
Status: ON HOLD | COMMUNITY
Start: 2021-08-22 | End: 2021-10-01 | Stop reason: CLARIF

## 2021-10-01 RX ORDER — LEFLUNOMIDE 20 MG/1
20 TABLET ORAL DAILY
COMMUNITY
Start: 2021-09-21

## 2021-10-01 RX ORDER — CEPHALEXIN 500 MG/1
CAPSULE ORAL
COMMUNITY
Start: 2021-09-22 | End: 2022-02-09

## 2021-10-01 RX ORDER — SODIUM CHLORIDE, SODIUM LACTATE, POTASSIUM CHLORIDE, CALCIUM CHLORIDE 600; 310; 30; 20 MG/100ML; MG/100ML; MG/100ML; MG/100ML
INJECTION, SOLUTION INTRAVENOUS
Status: DISCONTINUED | OUTPATIENT
Start: 2021-10-01 | End: 2021-10-01 | Stop reason: HOSPADM

## 2021-10-01 RX ORDER — SODIUM CHLORIDE 9 MG/ML
25 INJECTION, SOLUTION INTRAVENOUS CONTINUOUS
Status: DISCONTINUED | OUTPATIENT
Start: 2021-10-01 | End: 2021-10-01 | Stop reason: HOSPADM

## 2021-10-01 RX ADMIN — PROPOFOL 30 MG: 10 INJECTION, EMULSION INTRAVENOUS at 12:07

## 2021-10-01 RX ADMIN — SODIUM CHLORIDE, POTASSIUM CHLORIDE, SODIUM LACTATE AND CALCIUM CHLORIDE: 600; 310; 30; 20 INJECTION, SOLUTION INTRAVENOUS at 11:59

## 2021-10-01 RX ADMIN — PROPOFOL 70 MG: 10 INJECTION, EMULSION INTRAVENOUS at 12:02

## 2021-10-01 RX ADMIN — PROPOFOL 40 MG: 10 INJECTION, EMULSION INTRAVENOUS at 12:03

## 2021-10-01 NOTE — PROGRESS NOTES
Patient given discharge education verbally and gave back verbal understanding. Patient taken to ride's car via wheelchair at front of hospital entrance.

## 2021-10-01 NOTE — ANESTHESIA PREPROCEDURE EVALUATION
Relevant Problems   No relevant active problems       Anesthetic History   No history of anesthetic complications            Review of Systems / Medical History  Patient summary reviewed, nursing notes reviewed and pertinent labs reviewed    Pulmonary  Within defined limits                 Neuro/Psych   Within defined limits           Cardiovascular    Hypertension              Exercise tolerance: >4 METS     GI/Hepatic/Renal  Within defined limits              Endo/Other      Hypothyroidism  Arthritis     Other Findings              Physical Exam    Airway  Mallampati: II  TM Distance: 4 - 6 cm  Neck ROM: normal range of motion        Cardiovascular    Rhythm: regular  Rate: normal         Dental    Dentition: Lower dentition intact and Upper partial plate     Pulmonary      Decreased breath sounds           Abdominal  GI exam deferred       Other Findings            Anesthetic Plan    ASA: 2  Anesthesia type: total IV anesthesia and MAC          Induction: Intravenous  Anesthetic plan and risks discussed with: Patient

## 2021-10-01 NOTE — ANESTHESIA POSTPROCEDURE EVALUATION
Procedure(s):  COLONOSCOPY.    total IV anesthesia, MAC    Anesthesia Post Evaluation      Multimodal analgesia: multimodal analgesia not used between 6 hours prior to anesthesia start to PACU discharge  Patient location during evaluation: bedside (Endoscopy suite)  Patient participation: complete - patient cannot participate  Level of consciousness: sleepy but conscious  Pain score: 0  Pain management: adequate  Airway patency: patent  Anesthetic complications: no  Cardiovascular status: acceptable  Respiratory status: acceptable and nasal cannula  Hydration status: acceptable  Comments: This patient remained on the stretcher. The patient was handed off to the endoscopy nursing team.  All questions regarding pre-, intra-, and postoperative care were answered. Post anesthesia nausea and vomiting:  none      INITIAL Post-op Vital signs: No vitals data found for the desired time range.

## 2021-10-03 DIAGNOSIS — M54.12 CERVICAL RADICULOPATHY: ICD-10-CM

## 2021-10-06 RX ORDER — AMITRIPTYLINE HYDROCHLORIDE 25 MG/1
TABLET, FILM COATED ORAL
Qty: 60 TABLET | Refills: 0 | Status: SHIPPED | OUTPATIENT
Start: 2021-10-06 | End: 2021-11-04 | Stop reason: ALTCHOICE

## 2021-10-19 DIAGNOSIS — I10 ESSENTIAL HYPERTENSION: ICD-10-CM

## 2021-10-19 RX ORDER — LOSARTAN POTASSIUM 50 MG/1
50 TABLET ORAL DAILY
Qty: 30 TABLET | Refills: 1 | Status: SHIPPED | OUTPATIENT
Start: 2021-10-19 | End: 2021-10-20 | Stop reason: SDUPTHER

## 2021-10-20 ENCOUNTER — TELEPHONE (OUTPATIENT)
Dept: FAMILY MEDICINE CLINIC | Age: 51
End: 2021-10-20

## 2021-10-20 DIAGNOSIS — I10 ESSENTIAL HYPERTENSION: ICD-10-CM

## 2021-10-22 RX ORDER — LOSARTAN POTASSIUM 50 MG/1
50 TABLET ORAL DAILY
Qty: 90 TABLET | Refills: 1 | Status: SHIPPED | OUTPATIENT
Start: 2021-10-22

## 2021-10-22 NOTE — TELEPHONE ENCOUNTER
Identifying Data:  54 y.o. , Ethan Lovell , female     HISTORICAL DATA (REVIEWED TODAY):  ALLERGIES-    No Known Allergies    MEDICATION AS OF LAST RECONCILIATION (NOT INCLUDING CHANGES MADE TODAY):    Current Outpatient Medications on File Prior to Visit   Medication Sig Dispense Refill    amitriptyline (ELAVIL) 25 mg tablet TAKE 1 TABLET BY MOUTH 4 DAYS 1 HOUR PRIOR TO BEDTIME AND THEN TAKE 2 TABLETS AT BEDTIME 60 Tablet 0    cephALEXin (KEFLEX) 500 mg capsule TAKE 1 CAPSULE BY MOUTH EVERY 8 HOURS FOR 7 DAYS      leflunomide (ARAVA) 20 mg tablet Take 20 mg by mouth daily.  albuterol (PROVENTIL HFA, VENTOLIN HFA, PROAIR HFA) 90 mcg/actuation inhaler Take 2 Puffs by inhalation every four (4) hours as needed for Wheezing. No current facility-administered medications on file prior to visit. PAST MEDICAL HISTORY:    Patient Active Problem List   Diagnosis Code    Goiter E04.9    Insomnia G47.00    Malignant tumor of cervix ([facility] [address] C53.9    Lymphadenitis, acute L04.9       ASSESSMENT AND PLAN:      ICD-10-CM ICD-9-CM    1.  Essential hypertension  I10 401.9 losartan (COZAAR) 50 mg tablet             Autumn Frye DO

## 2021-11-04 ENCOUNTER — OFFICE VISIT (OUTPATIENT)
Dept: ENDOCRINOLOGY | Age: 51
End: 2021-11-04
Payer: MEDICAID

## 2021-11-04 VITALS
BODY MASS INDEX: 39.91 KG/M2 | SYSTOLIC BLOOD PRESSURE: 133 MMHG | DIASTOLIC BLOOD PRESSURE: 88 MMHG | OXYGEN SATURATION: 99 % | HEIGHT: 66 IN | HEART RATE: 71 BPM | WEIGHT: 248.3 LBS | TEMPERATURE: 97.8 F

## 2021-11-04 DIAGNOSIS — R94.6 ABNORMAL THYROID FUNCTION TEST: Primary | ICD-10-CM

## 2021-11-04 PROCEDURE — 99204 OFFICE O/P NEW MOD 45 MIN: CPT | Performed by: INTERNAL MEDICINE

## 2021-11-04 NOTE — LETTER
11/4/2021 Patient: Brian Lovelace YOB: 1970 Date of Visit: 11/4/2021 Fredi Davidson DO 
[address] [address] [address] [facility] [address] Dear Fredi Davidson DO, Thank you for referring Ms. Uzma Coon to [address] [place] [address] for evaluation. My notes for this consultation are attached. If you have questions, please do not hesitate to call me. I look forward to following your patient along with you. Sincerely, Ezio Butler MD

## 2021-11-04 NOTE — PROGRESS NOTES
History and Physical    Patient: Abel Randle MRN: [id number]  SSN: [id number]    YOB: 1970  Age: 54 y.o. Sex: female      Subjective:      Abel Randle is a 54 y.o. female with past medical history of hypertension, rheumatoid arthritis is sent to me by primary care physician Dr. Carina Pérez for abnormal thyroid function tests. Patient had labs done in July 2021 for routine purposes. This incidentally came back showing suppressed TSH. So patient is sent here for further evaluation and management. Symptoms: No heat or cold intolerance, bowel movements are regular, sleep is okay, no palpitations or tremors, no unintentional weight changes, no hair or skin changes  Prior history of thyroid problems: ? Goiter  Recent steroid treatments: no  High dose Biotin supplemnts: no  Recent URI: no  Tenderness in neck: no  Swelling in neck: no  Family history of thyroid problems: no  Personal/family history of autoimmune diseases: patient has RA, sister has Psoriasis  smoking: no  Change in appearance of eyes/ redness/ eye irritation: no  Personal history of cardiac disease: no she had some chest discomfort last year associated with shortness of breath, she had cardiac eval and everything came back okay  Personal history of osteoporosis/fragility fractures: no    Past Medical History:   Diagnosis Date    Arthritis     History of removal of neck cyst     left side;currently taking antibiotics.  patient stated cyst burst    Hypertension     Ill-defined condition     history of chemotherapy and radiation    Insomnia 8/5/2020    Malignant tumor of cervix ([facility] [address] 8/5/2020     Past Surgical History:   Procedure Laterality Date    COLONOSCOPY N/A 10/1/2021    COLONOSCOPY performed by Nahum De La Cruz [facility] at [facility] ENDOSCOPY-DUE 2026    HX BREAST REDUCTION Bilateral 2006    HX HYSTERECTOMY      HX NEPHRECTOMY Right 2012      Family History   Problem Relation Age of Onset    Breast Cancer Paternal Grandmother 61    Cancer Paternal Grandmother         breast    Breast Cancer Paternal Aunt 48    Hypertension Mother     Hypertension Father     Diabetes Sister     Cancer Maternal Grandmother         lung     Social History     Tobacco Use    Smoking status: Never Smoker    Smokeless tobacco: Never Used   Substance Use Topics    Alcohol use: Not Currently      Prior to Admission medications    Medication Sig Start Date End Date Taking? Authorizing Provider   losartan (COZAAR) 50 mg tablet Take 1 Tablet by mouth daily. 10/22/21  Yes Martha Gayle,    leflunomide (ARAVA) 20 mg tablet Take 20 mg by mouth daily. 9/21/21  Yes Provider, Historical   albuterol (PROVENTIL HFA, VENTOLIN HFA, PROAIR HFA) 90 mcg/actuation inhaler Take 2 Puffs by inhalation every four (4) hours as needed for Wheezing. Yes Provider, Historical   cephALEXin (KEFLEX) 500 mg capsule TAKE 1 CAPSULE BY MOUTH EVERY 8 HOURS FOR 7 DAYS  Patient not taking: Reported on 11/4/2021 9/22/21   Provider, Historical        No Known Allergies    Review of Systems:  ROS    A comprehensive review of systems was preformed and it is negative except mentioned in HPI    Objective:     Vitals:    11/04/21 0912 11/04/21 0920   BP: (!) 139/91 133/88   Pulse: 71    Temp: 97.8 °F (36.6 °C)    TempSrc: Temporal    SpO2: 99%    Weight: 248 lb 4.8 oz (112.6 kg)    Height: 5' 6\" (1.676 m)         Physical Exam:    Physical Exam  Vitals and nursing note reviewed. Constitutional:       Appearance: She is obese. HENT:      Head: Normocephalic and atraumatic. Eyes:      Extraocular Movements: Extraocular movements intact. Conjunctiva/sclera: Conjunctivae normal.      Pupils: Pupils are equal, round, and reactive to light. Neck:      Comments: Thyroid normal on palpation  Cardiovascular:      Rate and Rhythm: Normal rate and regular rhythm. Pulmonary:      Effort: Pulmonary effort is normal.      Breath sounds: Normal breath sounds.    Musculoskeletal: Cervical back: Neck supple. Skin:     General: Skin is warm. Neurological:      General: No focal deficit present. Mental Status: She is alert and oriented to person, place, and time. Psychiatric:         Mood and Affect: Mood normal.         Behavior: Behavior normal.          Labs and Imaging:    Last 3 Recorded Weights in this Encounter    11/04/21 0912   Weight: 248 lb 4.8 oz (112.6 kg)        No results found for: HBA1C, PEI9KITS, WLT7UBMR, ECB8XUFK     Assessment:     Patient Active Problem List   Diagnosis Code    Goiter E04.9    Insomnia G47.00    Malignant tumor of cervix ([facility] [address] C53.9    Lymphadenitis, acute L04.9           Plan:     Abnormal thyroid function tests/Hyperthyroidism:  I reviewed labs and notes from the referring provider's office. 7-:  TSH suppressed at 0.267 (0.454. 5)  Normal CBC and liver enzymes    No prior thyroid labs available for comparison. Clinically she is euthyroid  Plan:  Check thyroid function test along with thyroid antibodies. Further management to be decided based on the result of that test.  TSH continues to be borderline abnormal, we will continue with monitoring because she is asymptomatic. Essential hypertension:  Blood pressure well controlled on current medications.     Orders Placed This Encounter    TSH AND FREE T4    THYROID STIMULATING IMMUNOGLOBULIN    TSH RECEPTOR AB    THYROID PEROXIDASE (TPO) AB        Signed By: Abad Chiu MD     November 4, 2021      Return to clinic as needed

## 2021-11-05 DIAGNOSIS — R94.6 ABNORMAL THYROID FUNCTION TEST: Primary | ICD-10-CM

## 2021-11-05 LAB
T4 FREE SERPL-MCNC: 1.12 NG/DL (ref 0.82–1.77)
THYROPEROXIDASE AB SERPL-ACNC: <8 IU/ML (ref 0–34)
TSH RECEP AB SER-ACNC: <1.1 IU/L (ref 0–1.75)
TSH SERPL DL<=0.005 MIU/L-ACNC: 0.42 UIU/ML (ref 0.45–4.5)
TSI SER-ACNC: <0.1 IU/L (ref 0–0.55)

## 2021-11-05 NOTE — PROGRESS NOTES
Please inform patient that her thyroid labs continue to be borderline abnormal but it is much better than labs from 3 months back, moving in the right direction. I would like to see her back in 3 months and repeat labs 2 to 3 days prior to the visit. Please mail lab order to patient. Please put her on schedule for 3-month follow-up.

## 2021-11-06 DIAGNOSIS — M54.12 CERVICAL RADICULOPATHY: ICD-10-CM

## 2021-11-08 ENCOUNTER — TELEPHONE (OUTPATIENT)
Dept: ENDOCRINOLOGY | Age: 51
End: 2021-11-08

## 2021-11-08 RX ORDER — AMITRIPTYLINE HYDROCHLORIDE 25 MG/1
TABLET, FILM COATED ORAL
Qty: 60 TABLET | Refills: 0 | Status: SHIPPED | OUTPATIENT
Start: 2021-11-08 | End: 2022-02-09

## 2021-11-08 NOTE — TELEPHONE ENCOUNTER
Pt has been informed and scheduled for 3 month f/u  ----- Message from Ginger Pleitez MD sent at 11/5/2021  4:46 PM EDT -----  Please inform patient that her thyroid labs continue to be borderline abnormal but it is much better than labs from 3 months back, moving in the right direction. I would like to see her back in 3 months and repeat labs 2 to 3 days prior to the visit. Please mail lab order to patient. Please put her on schedule for 3-month follow-up.

## 2022-02-05 DIAGNOSIS — R94.6 ABNORMAL THYROID FUNCTION TEST: ICD-10-CM

## 2022-02-09 ENCOUNTER — OFFICE VISIT (OUTPATIENT)
Dept: ENDOCRINOLOGY | Age: 52
End: 2022-02-09
Payer: MEDICAID

## 2022-02-09 VITALS
TEMPERATURE: 97.8 F | DIASTOLIC BLOOD PRESSURE: 84 MMHG | HEART RATE: 74 BPM | BODY MASS INDEX: 41.46 KG/M2 | WEIGHT: 258 LBS | SYSTOLIC BLOOD PRESSURE: 131 MMHG | OXYGEN SATURATION: 99 % | HEIGHT: 66 IN

## 2022-02-09 DIAGNOSIS — R94.6 ABNORMAL THYROID FUNCTION TEST: Primary | ICD-10-CM

## 2022-02-09 PROCEDURE — 99214 OFFICE O/P EST MOD 30 MIN: CPT | Performed by: INTERNAL MEDICINE

## 2022-02-09 RX ORDER — PREDNISONE 5 MG/1
TABLET ORAL
COMMUNITY
Start: 2022-01-18

## 2022-02-09 NOTE — PROGRESS NOTES
History and Physical    Patient: Earnest Shay MRN: [id number]  SSN: [id number]    YOB: 1970  Age: 50 y.o. Sex: female      Subjective:      Earnest Shay is a 50 y.o. female with past medical history of hypertension, rheumatoid arthritis is here for follow-up of abnormal thyroid function tests. She was sent to me by primary care physician Dr. Matt Roger. Patient had labs done in July 2021 for routine purposes. This incidentally came back showing suppressed TSH. So patient is sent here for further evaluation and management. Labs done after last visit continue to show slightly abnormal TSH but it was looking much better. Clinically she was euthyroid so decided to continue with observation. Patient was advised to get labs done prior to this visit, which she forgot. She has some palpitations and tremors, which is not new and she always attributed it to rheumatoid arthritis. She has gained 10 pound since last visit, which she attributes to being on prednisone for rheumatoid arthritis.     Initial history:  Symptoms: No heat or cold intolerance, bowel movements are regular, sleep is okay, no palpitations or tremors, no unintentional weight changes, no hair or skin changes  Prior history of thyroid problems: ? Goiter  Recent steroid treatments: no  High dose Biotin supplemnts: no  Recent URI: no  Tenderness in neck: no  Swelling in neck: no  Family history of thyroid problems: no  Personal/family history of autoimmune diseases: patient has RA, sister has Psoriasis  smoking: no  Change in appearance of eyes/ redness/ eye irritation: no  Personal history of cardiac disease: no she had some chest discomfort last year associated with shortness of breath, she had cardiac eval and everything came back okay  Personal history of osteoporosis/fragility fractures: no    Past Medical History:   Diagnosis Date    Arthritis     History of removal of neck cyst     left side;currently taking antibiotics. patient stated cyst burst    Hypertension     Ill-defined condition     history of chemotherapy and radiation    Insomnia 8/5/2020    Malignant tumor of cervix ([address] 8/5/2020     Past Surgical History:   Procedure Laterality Date    COLONOSCOPY N/A 10/1/2021    COL[facility] performed by Sw[facility] at [facility] ENDOSCOPY-DUE 2026    HX BREAST REDUCTION Bilateral 2006    HX CYST REMOVAL  12/22/2021    back of neck    HX HYSTERECTOMY      HX NEPHRECTOMY Right 2012      Family History   Problem Relation Age of Onset    Breast Cancer Paternal Grandmother 61    Cancer Paternal Grandmother         breast    Breast Cancer Paternal Aunt 48    Hypertension Mother     Hypertension Father     Diabetes Sister     Cancer Maternal Grandmother         lung     Social History     Tobacco Use    Smoking status: Never Smoker    Smokeless tobacco: Never Used   Substance Use Topics    Alcohol use: Not Currently      Prior to Admission medications    Medication Sig Start Date End Date Taking? Authorizing Provider   predniSONE (DELTASONE) 5 mg tablet  1/18/22  Yes Provider, Historical   losartan (COZAAR) 50 mg tablet Take 1 Tablet by mouth daily. 10/22/21  Yes Brian Dewitt DO   leflunomide (ARAVA) 20 mg tablet Take 20 mg by mouth daily. 9/21/21  Yes Provider, Historical   albuterol (PROVENTIL HFA, VENTOLIN HFA, PROAIR HFA) 90 mcg/actuation inhaler Take 2 Puffs by inhalation every four (4) hours as needed for Wheezing. Patient not taking: Reported on 2/9/2022    Provider, Historical        No Known Allergies    Review of Systems:  ROS    A comprehensive review of systems was preformed and it is negative except mentioned in HPI    Objective:     Vitals:    02/09/22 1015 02/09/22 1019   BP: (!) 133/93 131/84   Pulse: 76 74   Temp: 97.8 °F (36.6 °C)    TempSrc: Temporal    SpO2: 99%    Weight: 258 lb (117 kg)    Height: 5' 6\" (1.676 m)         Physical Exam:    Physical Exam  Vitals and nursing note reviewed. Constitutional:       Appearance: She is obese. HENT:      Head: Normocephalic and atraumatic. Neck:      Comments: Thyroid normal on palpation  Cardiovascular:      Rate and Rhythm: Normal rate and regular rhythm. Pulmonary:      Effort: Pulmonary effort is normal.      Breath sounds: Normal breath sounds. Neurological:      Mental Status: She is alert. Labs and Imaging:    Last 3 Recorded Weights in this Encounter    02/09/22 1015   Weight: 258 lb (117 kg)        No results found for: HBA1C, [id number], DJA7LCBY, ELP9MNNT     Assessment:     Patient Active Problem List   Diagnosis Code    Goiter E04.9    Insomnia G47.00    Malignant tumor of cervix ([facility] [address] C53.9    Lymphadenitis, acute L04.9    Abnormal thyroid function test R94.6           Plan:     Abnormal thyroid function tests/Hyperthyroidism:  7-:  TSH suppressed at 0.267 (0.454. 5)  Normal CBC and liver enzymes    No prior thyroid labs available for comparison. Clinically she is euthyroid     11-4-2021:  TSH suppressed but much better at 0.422  Free T4 normal at 1.12  TPO, TSI and TSH receptor antibody undetectable  Since TSH was improving and patient was euthyroid clinically, we decided to continue with observation. Patient was advised to get labs done prior to this visit, which she did not do. This appears to be subacute thyroiditis and resolution. Plan:  Check TSH and free T4 today. If it is looking normal, no further follow-up required from endocrine standpoint. Essential hypertension:  Blood pressure well controlled on current medications. No orders of the defined types were placed in this encounter.        Signed By: Anne Malone MD     February 9, 2022      Return to clinic as needed

## 2022-02-09 NOTE — LETTER
2/9/2022    Patient: Khai Macdonald   YOB: 1970   Date of Visit: 2/9/2022     Angeli Parra DO  [address] [address]  Via Fax: [phone]    Dear Angeli Parra DO,      Thank you for referring Ms. Jennifer Lam to [address] [place] [address] for evaluation. My notes for this consultation are attached. If you have questions, please do not hesitate to call me. I look forward to following your patient along with you.       Sincerely,    Kavya Dill MD

## 2022-02-10 ENCOUNTER — TELEPHONE (OUTPATIENT)
Dept: ENDOCRINOLOGY | Age: 52
End: 2022-02-10

## 2022-02-10 LAB
T4 FREE SERPL-MCNC: 1.14 NG/DL (ref 0.82–1.77)
TSH SERPL DL<=0.005 MIU/L-ACNC: 0.79 UIU/ML (ref 0.45–4.5)

## 2022-02-10 NOTE — PROGRESS NOTES
Please inform patient that her thyroid labs are looking perfectly normal.  As discussed with her yesterday, it is possible she had inflammation of thyroid which has resolved on its own. I recommend getting thyroid labs checked every 6 months with PCP and come back to see me as needed.

## 2022-02-10 NOTE — TELEPHONE ENCOUNTER
LVM for pt to call office back  ----- Message from Kavya Dill MD sent at 2/10/2022 10:04 AM EST -----  Please inform patient that her thyroid labs are looking perfectly normal.  As discussed with her yesterday, it is possible she had inflammation of thyroid which has resolved on its own. I recommend getting thyroid labs checked every 6 months with PCP and come back to see me as needed.

## 2022-02-11 ENCOUNTER — TELEPHONE (OUTPATIENT)
Dept: ENDOCRINOLOGY | Age: 52
End: 2022-02-11

## 2022-02-11 NOTE — TELEPHONE ENCOUNTER
Pt has been informed  ----- Message from Nisha Plascencia MD sent at 2/10/2022 10:04 AM EST -----  Please inform patient that her thyroid labs are looking perfectly normal.  As discussed with her yesterday, it is possible she had inflammation of thyroid which has resolved on its own. I recommend getting thyroid labs checked every 6 months with PCP and come back to see me as needed.

## 2022-03-18 PROBLEM — C53.9 MALIGNANT TUMOR OF CERVIX (HCC): Status: ACTIVE | Noted: 2020-08-05

## 2022-03-19 PROBLEM — G47.00 INSOMNIA: Status: ACTIVE | Noted: 2020-08-05

## 2022-03-19 PROBLEM — R94.6 ABNORMAL THYROID FUNCTION TEST: Status: ACTIVE | Noted: 2022-02-09

## 2022-03-19 PROBLEM — E04.9 GOITER: Status: ACTIVE | Noted: 2020-08-05

## 2022-03-20 PROBLEM — L04.9 LYMPHADENITIS, ACUTE: Status: ACTIVE | Noted: 2020-08-07

## 2022-12-27 NOTE — PROGRESS NOTES
Neg mammo, one year f/up SOB, likely CHF exacerbation SOB, likely CHF exacerbation SOB, likely CHF exacerbation SOB, likely CHF exacerbation SOB, likely CHF exacerbation SOB, likely CHF exacerbation SOB, likely CHF exacerbation SOB, likely CHF exacerbation SOB, likely CHF exacerbation SOB, likely CHF exacerbation SOB, likely CHF exacerbation SOB, likely CHF exacerbation SOB, likely CHF exacerbation SOB, likely CHF exacerbation SOB, likely CHF exacerbation SOB, likely CHF exacerbation SOB, likely CHF exacerbation SOB, likely CHF exacerbation SOB, likely CHF exacerbation SOB, likely CHF exacerbation SOB, likely CHF exacerbation SOB, likely CHF exacerbation SOB, likely CHF exacerbation SOB, likely CHF exacerbation SOB, likely CHF exacerbation SOB, likely CHF exacerbation SOB, likely CHF exacerbation SOB, likely CHF exacerbation SOB, likely CHF exacerbation SOB, likely CHF exacerbation SOB, likely CHF exacerbation SOB, likely CHF exacerbation SOB, likely CHF exacerbation SOB, likely CHF exacerbation SOB, likely CHF exacerbation SOB, likely CHF exacerbation SOB, likely CHF exacerbation SOB, likely CHF exacerbation

## 2023-01-26 ENCOUNTER — OFFICE VISIT (OUTPATIENT)
Dept: FAMILY MEDICINE CLINIC | Age: 53
End: 2023-01-26
Payer: MEDICAID

## 2023-01-26 VITALS
OXYGEN SATURATION: 96 % | HEIGHT: 65 IN | SYSTOLIC BLOOD PRESSURE: 134 MMHG | DIASTOLIC BLOOD PRESSURE: 82 MMHG | HEART RATE: 74 BPM | TEMPERATURE: 98.3 F | RESPIRATION RATE: 18 BRPM | BODY MASS INDEX: 45.32 KG/M2 | WEIGHT: 272 LBS

## 2023-01-26 DIAGNOSIS — Z00.00 ENCOUNTER FOR MEDICAL EXAMINATION TO ESTABLISH CARE: Primary | ICD-10-CM

## 2023-01-26 DIAGNOSIS — I10 ESSENTIAL HYPERTENSION: ICD-10-CM

## 2023-01-26 DIAGNOSIS — Z98.890 H/O REMOVAL OF NECK CYST: ICD-10-CM

## 2023-01-26 DIAGNOSIS — Z12.31 ENCOUNTER FOR SCREENING MAMMOGRAM FOR MALIGNANT NEOPLASM OF BREAST: ICD-10-CM

## 2023-01-26 DIAGNOSIS — Z11.59 NEED FOR HEPATITIS C SCREENING TEST: ICD-10-CM

## 2023-01-26 DIAGNOSIS — Z00.00 LABORATORY EXAM ORDERED AS PART OF ROUTINE GENERAL MEDICAL EXAMINATION: ICD-10-CM

## 2023-01-26 DIAGNOSIS — R10.9 FLANK PAIN: ICD-10-CM

## 2023-01-26 LAB
BILIRUB UR QL STRIP: NEGATIVE
GLUCOSE UR-MCNC: NEGATIVE MG/DL
KETONES P FAST UR STRIP-MCNC: NEGATIVE MG/DL
PH UR STRIP: 5 [PH] (ref 4.6–8)
PROT UR QL STRIP: NEGATIVE
SP GR UR STRIP: 1 (ref 1–1.03)
UA UROBILINOGEN AMB POC: ABNORMAL (ref 0.2–1)
URINALYSIS CLARITY POC: CLEAR
URINALYSIS COLOR POC: YELLOW
URINE BLOOD POC: ABNORMAL
URINE LEUKOCYTES POC: NEGATIVE
URINE NITRITES POC: NEGATIVE

## 2023-01-26 PROCEDURE — 81003 URINALYSIS AUTO W/O SCOPE: CPT

## 2023-01-26 PROCEDURE — 3075F SYST BP GE 130 - 139MM HG: CPT

## 2023-01-26 PROCEDURE — 99204 OFFICE O/P NEW MOD 45 MIN: CPT

## 2023-01-26 PROCEDURE — 3079F DIAST BP 80-89 MM HG: CPT

## 2023-01-26 NOTE — PATIENT INSTRUCTIONS
DASH Diet: Care Instructions  Your Care Instructions     The DASH diet is an eating plan that can help lower your blood pressure. DASH stands for Dietary Approaches to Stop Hypertension. Hypertension is high blood pressure. The DASH diet focuses on eating foods that are high in calcium, potassium, and magnesium. These nutrients can lower blood pressure. The foods that are highest in these nutrients are fruits, vegetables, low-fat dairy products, nuts, seeds, and legumes. But taking calcium, potassium, and magnesium supplements instead of eating foods that are high in those nutrients does not have the same effect. The DASH diet also includes whole grains, fish, and poultry. The DASH diet is one of several lifestyle changes your doctor may recommend to lower your high blood pressure. Your doctor may also want you to decrease the amount of sodium in your diet. Lowering sodium while following the DASH diet can lower blood pressure even further than just the DASH diet alone. Follow-up care is a key part of your treatment and safety. Be sure to make and go to all appointments, and call your doctor if you are having problems. It's also a good idea to know your test results and keep a list of the medicines you take. How can you care for yourself at home? Following the DASH diet  Eat 4 to 5 servings of fruit each day. A serving is 1 medium-sized piece of fruit, ½ cup chopped or canned fruit, 1/4 cup dried fruit, or 4 ounces (½ cup) of fruit juice. Choose fruit more often than fruit juice. Eat 4 to 5 servings of vegetables each day. A serving is 1 cup of lettuce or raw leafy vegetables, ½ cup of chopped or cooked vegetables, or 4 ounces (½ cup) of vegetable juice. Choose vegetables more often than vegetable juice. Get 2 to 3 servings of low-fat and fat-free dairy each day. A serving is 8 ounces of milk, 1 cup of yogurt, or 1 ½ ounces of cheese. Eat 6 to 8 servings of grains each day.  A serving is 1 slice of bread, 1 ounce of dry cereal, or ½ cup of cooked rice, pasta, or cooked cereal. Try to choose whole-grain products as much as possible. Limit lean meat, poultry, and fish to 2 servings each day. A serving is 3 ounces, about the size of a deck of cards. Eat 4 to 5 servings of nuts, seeds, and legumes (cooked dried beans, lentils, and split peas) each week. A serving is 1/3 cup of nuts, 2 tablespoons of seeds, or ½ cup of cooked beans or peas. Limit fats and oils to 2 to 3 servings each day. A serving is 1 teaspoon of vegetable oil or 2 tablespoons of salad dressing. Limit sweets and added sugars to 5 servings or less a week. A serving is 1 tablespoon jelly or jam, ½ cup sorbet, or 1 cup of lemonade. Eat less than 2,300 milligrams (mg) of sodium a day. If you limit your sodium to 1,500 mg a day, you can lower your blood pressure even more. Be aware that all of these are the suggested number of servings for people who eat 1,800 to 2,000 calories a day. Your recommended number of servings may be different if you need more or fewer calories. Tips for success  Start small. Do not try to make dramatic changes to your diet all at once. You might feel that you are missing out on your favorite foods and then be more likely to not follow the plan. Make small changes, and stick with them. Once those changes become habit, add a few more changes. Try some of the following:  Make it a goal to eat a fruit or vegetable at every meal and at snacks. This will make it easy to get the recommended amount of fruits and vegetables each day. Try yogurt topped with fruit and nuts for a snack or healthy dessert. Add lettuce, tomato, cucumber, and onion to sandwiches. Combine a ready-made pizza crust with low-fat mozzarella cheese and lots of vegetable toppings. Try using tomatoes, squash, spinach, broccoli, carrots, cauliflower, and onions.   Have a variety of cut-up vegetables with a low-fat dip as an appetizer instead of chips and dip.  Sprinkle sunflower seeds or chopped almonds over salads. Or try adding chopped walnuts or almonds to cooked vegetables. Try some vegetarian meals using beans and peas. Add garbanzo or kidney beans to salads. Make burritos and tacos with mashed chen beans or black beans. Where can you learn more? Go to http://www.barron.com/  Enter H967 in the search box to learn more about \"DASH Diet: Care Instructions. \"  Current as of: January 10, 2022               Content Version: 13.4  © 5046-2217 [employer]. Care instructions adapted under license by [employer] (which disclaims liability or warranty for this information). If you have questions about a medical condition or this instruction, always ask your healthcare professional. [address] any warranty or liability for your use of this information.

## 2023-01-26 NOTE — PROGRESS NOTES
Chief Complaint   Patient presents with    [address] Maintenance reviewed     1. Have you been to the ER, urgent care clinic since your last visit? Hospitalized since your last visit? No     2. Have you seen or consulted any other health care providers outside of the [address] [place] [address] since your last visit? Include any pap smears or colon screening.   No

## 2023-01-26 NOTE — PROGRESS NOTES
Subjective: (As above and below)     Chief Complaint   Patient presents with    [address] is a 48 y.o. female and presents to the office to establish care. Preventative:   LMP: hysterectomy   Mammogram: Due now. Colonoscopy: 2021, due in 5 years. Hypertension Review:  The patient has essential hypertension   Was on Losartan but for the last week and a half she has not been taking her medication because she would notice that her arms and hands will go numb but when she does not take the losartan the symptoms do not occur. Home BP Monitoring: Yes, does not have log book with her and unsure what readings are. Pertinent ROS: no TIA's, no chest pain on exertion, no dyspnea on exertion, no swelling of ankles. Left flank pain:   Noticed the pain that started about 1 months ago. The pain is not every day, about 3 times a week and pain will last for a few hour. Pain is aching. She states, the pain is \"deep\" and not on the surface. 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 Denies any UTI symptoms, no fevers, chills, N/V/D, abdominal pain. Today she also states she had a cyst removed at the back of her neck in 2021. She feels like the cyst is back. She states, \"I feel like its wet back there but when I touch back there, it is dry. \"   No pain, fever, erythema, discharge, swelling. Patient states she will actually like to just go back to the surgeon who originally removed the cyst to have it evaluated. Nutrition Hx:  Diet: Trying to eat less sugar. Eating more vegetables. Rarely eats lunch. Rarely eats protein. Fast food about once a week. Only drinks water. Exercise: At home work out. Everyday for 30 minutes. Reviewed and agree with Nurse Note duplicated in this note. Reviewed PmHx, RxHx, FmHx, SocHx, AllgHx and updated in chart. No current outpatient medications on file.      No current facility-administered medications for this visit. No Known Allergies   Past Medical History:   Diagnosis Date    Arthritis     History of removal of neck cyst     left side;currently taking antibiotics. patient stated cyst burst    Hypertension     Ill-defined condition     history of chemotherapy and radiation    Insomnia 8/5/2020    Malignant tumor of cervix ([address] 8/5/2020      Past Surgical History:   Procedure Laterality Date    COLONOSCOPY N/A 10/1/2021    COLONOSCOPY performed by Delphine Sullivan MD at [facility] ENDOSCOPY-DUE 2026    HX BREAST REDUCTION Bilateral 2006    HX CYST REMOVAL  12/22/2021    back of neck    HX HYSTERECTOMY      HX NEPHRECTOMY Right 2012      Family History   Problem Relation Age of Onset    Breast Cancer Paternal Grandmother 61    Cancer Paternal Grandmother         breast    Breast Cancer Paternal Aunt 48    Hypertension Mother     Hypertension Father     Diabetes Sister     Cancer Maternal Grandmother         lung      Social History     Socioeconomic History    Marital status: SINGLE     Spouse name: Not on file    Number of children: Not on file    Years of education: Not on file    Highest education level: Not on file   Occupational History    Not on file   Tobacco Use    Smoking status: Never    Smokeless tobacco: Never   Vaping Use    Vaping Use: Never used   Substance and Sexual Activity    Alcohol use: Not Currently    Drug use: Never    Sexual activity: Not on file   Other Topics Concern    Not on file   Social History Narrative    Not on file     Social Determinants of Health     Financial Resource Strain: Not on file   Food Insecurity: Not on file   Transportation Needs: Not on file   Physical Activity: Not on file   Stress: Not on file   Social Connections: Not on file   Intimate Partner Violence: Not on file   Housing Stability: Not on file             Review of Systems   Constitutional:  Negative for chills, diaphoresis, fever, malaise/fatigue and weight loss.    HENT: Negative. Eyes: Negative. Respiratory:  Negative for cough and shortness of breath. Cardiovascular:  Negative for chest pain, palpitations and leg swelling. Gastrointestinal:  Negative for abdominal pain, blood in stool, constipation, diarrhea, heartburn, melena, nausea and vomiting. Genitourinary:  Positive for flank pain. Negative for dysuria, frequency, hematuria and urgency. Skin:         Detailed in HPI   Neurological:  Negative for dizziness, tingling, tremors, sensory change, speech change, focal weakness, seizures, loss of consciousness, weakness and headaches. Endo/Heme/Allergies: Negative. Psychiatric/Behavioral: Negative. Objective:     Physical Examination:    Visit Vitals  /82 (BP 1 Location: Left upper arm, BP Patient Position: Sitting, BP Cuff Size: Large adult)   Pulse 74   Temp 98.3 °F (36.8 °C) (Temporal)   Resp 18   Ht 5' 5\" (1.651 m)   Wt 272 lb (123.4 kg)   SpO2 96%   BMI 45.26 kg/m²       Physical Exam  Vitals and nursing note reviewed. Constitutional:       General: She is not in acute distress. Appearance: Normal appearance. HENT:      Head: Normocephalic. Right Ear: Tympanic membrane, ear canal and external ear normal.      Left Ear: Tympanic membrane, ear canal and external ear normal.      Nose: Nose normal.      Mouth/Throat:      Mouth: Mucous membranes are moist.      Pharynx: Oropharynx is clear. Eyes:      Extraocular Movements: Extraocular movements intact. Conjunctiva/sclera: Conjunctivae normal.      Pupils: Pupils are equal, round, and reactive to light. Neck:      Thyroid: No thyromegaly or thyroid tenderness. Vascular: No carotid bruit. Cardiovascular:      Rate and Rhythm: Normal rate and regular rhythm. Pulses: Normal pulses. Heart sounds: Normal heart sounds. Pulmonary:      Effort: Pulmonary effort is normal. No respiratory distress. Breath sounds: Normal breath sounds.    Abdominal:      General: Bowel sounds are normal. There is no distension. Palpations: Abdomen is soft. There is no hepatomegaly or splenomegaly. Tenderness: There is no abdominal tenderness. There is no right CVA tenderness or left CVA tenderness. Hernia: No hernia is present. Musculoskeletal:      Cervical back: No tenderness. Right lower leg: No edema. Left lower leg: No edema. Lymphadenopathy:      Cervical: No cervical adenopathy. Skin:     General: Skin is warm and dry. Comments: Back of the neck without swelling, erythema, warmth, discharge. No raised mass, nodule or cyst noted No open lesions. Scar tissue noted. Neurological:      General: No focal deficit present. Mental Status: She is alert and oriented to person, place, and time. Mental status is at baseline. Psychiatric:         Mood and Affect: Mood normal.         Behavior: Behavior normal.         Thought Content: Thought content normal.         Judgment: Judgment normal.           3 most recent PHQ Screens 1/26/2023   Little interest or pleasure in doing things Not at all   Feeling down, depressed, irritable, or hopeless Not at all   Total Score PHQ 2 0      Results for orders placed or performed in visit on 01/26/23   AMB POC URINALYSIS DIP STICK AUTO W/O MICRO   Result Value Ref Range    Color (UA POC) Yellow     Clarity (UA POC) Clear     Glucose (UA POC) Negative Negative    Bilirubin (UA POC) Negative Negative    Ketones (UA POC) Negative Negative    Specific gravity (UA POC) 1.000 (A) 1.001 - 1.035    Blood (UA POC) Trace Negative    pH (UA POC) 5.0 4.6 - 8.0    Protein (UA POC) Negative Negative    Urobilinogen (UA POC) 0.2 mg/dL 0.2 - 1    Nitrites (UA POC) Negative Negative    Leukocyte esterase (UA POC) Negative Negative      Assessment/ Plan:   Differential diagnosis and treatment options reviewed with patient who is in agreement with treatment plan as outlined below.     1. Encounter for medical examination to establish care    2. Essential hypertension  Vitals are stable today. Would like tighter control of blood pressure since she does only have one kidney. Patient would like to hold starting medication at this time. Discussed risk of uncontrol BP. Advised patient to bring BP log from home at next visit. Discussed diet, exercise and lifestyle modifications. Discussed hypertensive symptoms, follow-up if they develop. - CBC WITH AUTOMATED DIFF; Future  - METABOLIC PANEL, COMPREHENSIVE; Future  - AMB POC URINALYSIS DIP STICK AUTO W/O MICRO  - METABOLIC PANEL, COMPREHENSIVE  - CBC WITH AUTOMATED DIFF    3. Flank pain  US without bacteria but trace blood. Will send for culture. Advised patient to follow-up if she has any worsening symptoms.   - US RETROPERITONEUM COMP; Future  - CULTURE, URINE; Future    4. Need for hepatitis C screening test  - HEPATITIS C AB; Future  - HEPATITIS C AB    5. Laboratory exam ordered as part of routine general medical examination  - LIPID PANEL; Future  - HEMOGLOBIN A1C WITH EAG; Future  - TSH 3RD GENERATION; Future  - T4, FREE; Future  - T4, FREE  - TSH 3RD GENERATION  - HEMOGLOBIN A1C WITH EAG  - LIPID PANEL    6. Encounter for screening mammogram for malignant neoplasm of breast  - [facility] MAMMO BI SCREENING INCL CAD; Future    7. H/O removal of neck cyst  No cyst noted on my exam. Patient would like to follow-up with surgeon who removed the cyst originally. Follow-up if she has any worsening symptoms. Follow-up and Dispositions    Return in about 4 weeks (around 2/23/2023), or if symptoms worsen or fail to improve, for HTN follow-up . Medication Side Effects and Warnings were discussed with patient: yes  Patient Labs were reviewed: yes  Patient Past Records were reviewed:  yes    Verbal and written instructions (see AVS) provided. Patient expresses understanding and agreement of diagnosis and treatment plan.     Poppy Pink NP

## 2023-01-27 LAB
ALBUMIN SERPL-MCNC: 3.5 G/DL (ref 3.5–5)
ALBUMIN/GLOB SERPL: 1 (ref 1.1–2.2)
ALP SERPL-CCNC: 62 U/L (ref 45–117)
ALT SERPL-CCNC: 24 U/L (ref 12–78)
ANION GAP SERPL CALC-SCNC: 4 MMOL/L (ref 5–15)
AST SERPL-CCNC: 10 U/L (ref 15–37)
BASOPHILS # BLD: 0 K/UL (ref 0–0.1)
BASOPHILS NFR BLD: 1 % (ref 0–1)
BILIRUB SERPL-MCNC: 0.5 MG/DL (ref 0.2–1)
BUN SERPL-MCNC: 11 MG/DL (ref 6–20)
BUN/CREAT SERPL: 10 (ref 12–20)
CALCIUM SERPL-MCNC: 9.8 MG/DL (ref 8.5–10.1)
CHLORIDE SERPL-SCNC: 109 MMOL/L (ref 97–108)
CHOLEST SERPL-MCNC: 190 MG/DL
CO2 SERPL-SCNC: 26 MMOL/L (ref 21–32)
CREAT SERPL-MCNC: 1.11 MG/DL (ref 0.55–1.02)
DIFFERENTIAL METHOD BLD: NORMAL
EOSINOPHIL # BLD: 0.1 K/UL (ref 0–0.4)
EOSINOPHIL NFR BLD: 2 % (ref 0–7)
ERYTHROCYTE [DISTWIDTH] IN BLOOD BY AUTOMATED COUNT: 13.9 % (ref 11.5–14.5)
EST. AVERAGE GLUCOSE BLD GHB EST-MCNC: 126 MG/DL
GLOBULIN SER CALC-MCNC: 3.5 G/DL (ref 2–4)
GLUCOSE SERPL-MCNC: 97 MG/DL (ref 65–100)
HBA1C MFR BLD: 6 % (ref 4–5.6)
HCT VFR BLD AUTO: 45 % (ref 35–47)
HCV AB SERPL QL IA: NONREACTIVE
HDLC SERPL-MCNC: 52 MG/DL
HDLC SERPL: 3.7 (ref 0–5)
HGB BLD-MCNC: 14.3 G/DL (ref 11.5–16)
IMM GRANULOCYTES # BLD AUTO: 0 K/UL (ref 0–0.04)
IMM GRANULOCYTES NFR BLD AUTO: 0 % (ref 0–0.5)
LDLC SERPL CALC-MCNC: 113.6 MG/DL (ref 0–100)
LYMPHOCYTES # BLD: 2.3 K/UL (ref 0.8–3.5)
LYMPHOCYTES NFR BLD: 41 % (ref 12–49)
MCH RBC QN AUTO: 29.2 PG (ref 26–34)
MCHC RBC AUTO-ENTMCNC: 31.8 G/DL (ref 30–36.5)
MCV RBC AUTO: 91.8 FL (ref 80–99)
MONOCYTES # BLD: 0.5 K/UL (ref 0–1)
MONOCYTES NFR BLD: 9 % (ref 5–13)
NEUTS SEG # BLD: 2.6 K/UL (ref 1.8–8)
NEUTS SEG NFR BLD: 47 % (ref 32–75)
NRBC # BLD: 0 K/UL (ref 0–0.01)
NRBC BLD-RTO: 0 PER 100 WBC
PLATELET # BLD AUTO: 216 K/UL (ref 150–400)
PMV BLD AUTO: 11.7 FL (ref 8.9–12.9)
POTASSIUM SERPL-SCNC: 4.6 MMOL/L (ref 3.5–5.1)
PROT SERPL-MCNC: 7 G/DL (ref 6.4–8.2)
RBC # BLD AUTO: 4.9 M/UL (ref 3.8–5.2)
SODIUM SERPL-SCNC: 139 MMOL/L (ref 136–145)
T4 FREE SERPL-MCNC: 1.2 NG/DL (ref 0.8–1.5)
TRIGL SERPL-MCNC: 122 MG/DL (ref ?–150)
TSH SERPL DL<=0.05 MIU/L-ACNC: 0.64 UIU/ML (ref 0.36–3.74)
VLDLC SERPL CALC-MCNC: 24.4 MG/DL
WBC # BLD AUTO: 5.6 K/UL (ref 3.6–11)

## 2023-01-28 LAB
BACTERIA SPEC CULT: NORMAL
CC UR VC: NORMAL
SERVICE CMNT-IMP: NORMAL

## 2023-01-29 NOTE — PROGRESS NOTES
Urine culture is unremarkable for bacteria. Continue with hydration and cranberry juice as needed. Thyroid level is normal.     HgbA1c is elevated. She is prediabetic. And LDL is slightly elevated. No change from last year. Please increase regular exercise. Incorporate a low salt, low carb diet. More vegetables, fish/chicken and whole grains. Creatinine is slightly elevated please incorporate more fluids. Will wait for ultrasound results. Please continue to take BP at home as needed. Follow-up with  me in about 1-3 months for blood pressure assessment and we can recheck kidney function again. All other labs are unremarkable.

## 2023-02-02 ENCOUNTER — HOSPITAL ENCOUNTER (OUTPATIENT)
Dept: ULTRASOUND IMAGING | Age: 53
Discharge: HOME | End: 2023-02-02
Payer: COMMERCIAL

## 2023-02-02 ENCOUNTER — HOSPITAL ENCOUNTER (OUTPATIENT)
Dept: MAMMOGRAPHY | Age: 53
End: 2023-02-02
Payer: COMMERCIAL

## 2023-02-02 DIAGNOSIS — Z12.31 ENCOUNTER FOR SCREENING MAMMOGRAM FOR MALIGNANT NEOPLASM OF BREAST: ICD-10-CM

## 2023-02-02 DIAGNOSIS — R10.9 FLANK PAIN: ICD-10-CM

## 2023-02-02 PROCEDURE — 77063 BREAST TOMOSYNTHESIS BI: CPT

## 2023-02-02 PROCEDURE — 76770 US EXAM ABDO BACK WALL COMP: CPT

## 2023-02-03 ENCOUNTER — TELEPHONE (OUTPATIENT)
Dept: FAMILY MEDICINE CLINIC | Age: 53
End: 2023-02-03

## 2023-02-03 NOTE — PROGRESS NOTES
Ricky Ms. Cruz,     I would like to inform you that your ultrasound of your kidney was normal.   Please continue to increase hydration. Try to also do some back stretches and heat in case this is musculoskeletal in nature. I would like to repeat your kidney function and urine test in 1 month. You can make a lab appointment. Please follow-up if symptoms worsen or do not improve.  May need to consider nephrology referral.

## 2023-02-27 ENCOUNTER — VIRTUAL VISIT (OUTPATIENT)
Dept: FAMILY MEDICINE CLINIC | Age: 53
End: 2023-02-27
Payer: COMMERCIAL

## 2023-02-27 DIAGNOSIS — R10.9 ACUTE LEFT FLANK PAIN: Primary | ICD-10-CM

## 2023-02-27 DIAGNOSIS — E66.01 CLASS 3 SEVERE OBESITY WITHOUT SERIOUS COMORBIDITY WITH BODY MASS INDEX (BMI) OF 45.0 TO 49.9 IN ADULT, UNSPECIFIED OBESITY TYPE (HCC): ICD-10-CM

## 2023-02-27 DIAGNOSIS — Z90.5 HISTORY OF NEPHRECTOMY, RIGHT: ICD-10-CM

## 2023-02-27 DIAGNOSIS — Z76.89 ENCOUNTER FOR WEIGHT MANAGEMENT: ICD-10-CM

## 2023-02-27 PROCEDURE — 99213 OFFICE O/P EST LOW 20 MIN: CPT

## 2023-02-27 RX ORDER — LOSARTAN POTASSIUM 50 MG/1
50 TABLET ORAL DAILY
COMMUNITY
Start: 2023-01-30

## 2023-02-27 NOTE — PROGRESS NOTES
Bernardino Cordero is a 48 y.o. female who was seen by synchronous (real-time) audio-video technology on 2/27/2023 for Weight Management and Follow-up (CT scan/)        Assessment & Plan:   Diagnoses and all orders for this visit:    1. Acute left flank pain  Continue with increase hydration. Based on patient history will send to nephrology for further work up. Follow-up if she has any associated urinary symptoms, worsening fatigue or fever. Provided patient with information to nephrologist via [employer]. She will let us know if she cannot access referral then we will send via mail.   -     REFERRAL TO NEPHROLOGY  -     CT ABD W WO CONT; Future    2. History of nephrectomy, right  -     REFERRAL TO NEPHROLOGY  -     CT ABD W WO CONT; Future    3. Encounter for weight management  - Educated patient on diet, exercise and lifestyle modifications. Provided patient with weight loss clinic and [place] she try an OTC multivitamin. 4. Class 3 severe obesity without serious comorbidity with body mass index (BMI) of 45.0 to 49.9 in adult, unspecified obesity type ([address]  See recommendations above. The complexity of medical decision making for this visit is moderate   Follow-up and Dispositions    Return if symptoms worsen or fail to improve. I spent at least 25 minutes on this visit with this established patient. 712  Subjective:     Patient continues to have left sided flank pain. This was evaluated by me 1 month ago and at that time Ultrasound an urine labs were unremarkable. There was a slight bump in her creatinine level to 1.11, eGFR was normal.   Since that time she has been increasing cranberry juice without any relief. Drinking about 60 oz per day, only drinks water. Since last visit she states the pain is more consistent such as about 4 days a week compared to the 2-3 times a week about 1 month ago. The pain is worse at times compared to others.  Today, the pain is a 6/10 and the pain is always a throbbing pain. Pain is not aggravated with any particular movement such as bending or twisting. She states she always does morning stretches but that does not seem toe relief the pain. She states last time this happened she had a tumor on her right kidney and had to get a nephrectomy. Denies any fever, worsening fatigue, weight loss, confusion, decrease urine output, urinary symptoms, SOB, chest pain, nausea, abdominal pain. Today she also complains of difficulty losing weight. She has noticed the last few months she has been gaining weight. Has tried to do intermittent fasting and has decreased sugar intake without any change in weight. She does not like protein so eats more of a vegetarian diet. She will walk around her neighborhood roughly about 2 miles, 4 times a week. She is not interested in weight loss medication but was unsure if there was any supplements OTC that she can take to help manage weight. Believes this could be a hormonal imbalance. Denies worsening fatigue, fever, worsening hair loss, intolerance to temperature changes, anxiety/depression, GI distress, joint pain, chest pain or SOB. She is currently taking black cohosh for her night sweats which has helped. She has some hair loss at baseline and she states this runs in her family which she takes biotin for. TSH 1 month ago was within normal limits       Prior to Admission medications    Medication Sig Start Date End Date Taking? Authorizing Provider   losartan (COZAAR) 50 mg tablet Take 50 mg by mouth daily.  1/30/23  Yes Provider, Historical     Patient Active Problem List   Diagnosis Code    Goiter E04.9    Insomnia G47.00    Malignant tumor of cervix ([facility] [address] [address] C53.9    Lymphadenitis, acute L04.9    Abnormal thyroid function test R94.6     Patient Active Problem List    Diagnosis Date Noted    Abnormal thyroid function test 02/09/2022    Lymphadenitis, acute 08/07/2020    Goiter 08/05/2020 Insomnia 08/05/2020    Malignant tumor of cervix ([address] 08/05/2020     Current Outpatient Medications   Medication Sig Dispense Refill    losartan (COZAAR) 50 mg tablet Take 50 mg by mouth daily. No Known Allergies  Past Medical History:   Diagnosis Date    Arthritis     History of removal of neck cyst     left side;currently taking antibiotics. patient stated cyst burst    Hypertension     Ill-defined condition     history of chemotherapy and radiation    Insomnia 8/5/2020    Malignant tumor of cervix ([address] 8/5/2020     Past Surgical History:   Procedure Laterality Date    COLONOSCOPY N/A 10/1/2021    COLONOSCOPY performed by King Aurea MD at [facility] ENDOSCOPY-DUE 2026    HX BREAST REDUCTION Bilateral 2006    HX CYST REMOVAL  12/22/2021    back of neck    HX HYSTERECTOMY      HX NEPHRECTOMY Right 2012     Family History   Problem Relation Age of Onset    Breast Cancer Paternal Grandmother 61    Cancer Paternal Grandmother         breast    Breast Cancer Paternal Aunt 48    Hypertension Mother     Hypertension Father     Diabetes Sister     Cancer Maternal Grandmother         lung     Social History     Tobacco Use    Smoking status: Never    Smokeless tobacco: Never   Substance Use Topics    Alcohol use: Not Currently       Review of Systems   Constitutional:  Negative for chills, diaphoresis, fever, malaise/fatigue and weight loss. Respiratory:  Negative for cough and shortness of breath. Cardiovascular:  Negative for chest pain, palpitations and leg swelling. Gastrointestinal:  Negative for abdominal pain, blood in stool, constipation, diarrhea, heartburn, melena, nausea and vomiting. Genitourinary:  Negative for dysuria, flank pain, frequency, hematuria and urgency. Musculoskeletal:  Positive for back pain. Negative for falls, joint pain, myalgias and neck pain.    Neurological:  Negative for dizziness, tingling, tremors, sensory change, speech change, focal weakness, seizures, loss of consciousness, weakness and headaches. Endo/Heme/Allergies: Negative. Objective:   No flowsheet data found. General: alert, cooperative, no distress   Mental  status: normal mood, behavior, speech, dress, motor activity, and thought processes, able to follow commands   HENT: NCAT   Neck: no visualized mass   Resp: no respiratory distress   Neuro: no gross deficits   Skin: no discoloration or lesions of concern on visible areas   Psychiatric: normal affect, consistent with stated mood, no evidence of hallucinations       We discussed the expected course, resolution and complications of the diagnosis(es) in detail. Medication risks, benefits, costs, interactions, and alternatives were discussed as indicated. I advised her to contact the office if her condition worsens, changes or fails to improve as anticipated. She expressed understanding with the diagnosis(es) and plan. Ming Dolan, was evaluated through a synchronous (real-time) audio-video encounter. The patient (or guardian if applicable) is aware that this is a billable service, which includes applicable co-pays. This Virtual Visit was conducted with patient's (and/or legal guardian's) consent. The visit was conducted pursuant to the emergency declaration under the [facility], [address] [place] [address] authority and the [employer] and [employer] Act. Patient identification was verified, and a caregiver was present when appropriate. The patient was located at: Other: [address]. The provider was located at:  Facility (Appt Department): [facility]  Marguerite Ivan NP

## 2023-02-27 NOTE — PROGRESS NOTES
Chief Complaint   Patient presents with    Weight Management    Follow-up     CT scan       1. Have you been to the ER, urgent care clinic since your last visit? Hospitalized since your last visit? No    2. Have you seen or consulted any other health care providers outside of the [address] [place] [address] since your last visit? Include any pap smears or colon screening.  No    Health Maintenance Due   Topic Date Due    DTaP/Tdap/Td series (1 - Tdap) Never done    Shingles Vaccine (1 of 2) Never done    Flu Vaccine (1) Never done    COVID-19 Vaccine (5 - Booster for Pfizer series) 11/23/2022    Cervical cancer screen  02/09/2023

## 2023-03-08 ENCOUNTER — HOSPITAL ENCOUNTER (OUTPATIENT)
Dept: CT IMAGING | Age: 53
Discharge: HOME | End: 2023-03-08
Payer: COMMERCIAL

## 2023-03-08 DIAGNOSIS — R10.9 ACUTE LEFT FLANK PAIN: ICD-10-CM

## 2023-03-08 DIAGNOSIS — Z76.89 ENCOUNTER FOR WEIGHT MANAGEMENT: Primary | ICD-10-CM

## 2023-03-08 DIAGNOSIS — Z90.5 HISTORY OF NEPHRECTOMY, RIGHT: ICD-10-CM

## 2023-03-08 DIAGNOSIS — E66.01 MORBID OBESITY (HCC): ICD-10-CM

## 2023-03-08 PROCEDURE — 74011000636 HC RX REV CODE- 636

## 2023-03-08 PROCEDURE — 74170 CT ABD WO CNTRST FLWD CNTRST: CPT

## 2023-03-08 RX ADMIN — IOPAMIDOL 100 ML: 755 INJECTION, SOLUTION INTRAVENOUS at 07:35

## 2023-03-08 NOTE — PROGRESS NOTES
Patient attended our VIRTUAL Medically Supervised Weight Loss New Patient Orientation on Wednesday March 8, 2023 where we discussed:  New Direction Very Low and the Low Calorie diet details  Medical Supervision  Nutrition education  Cost of Meal Replacements

## 2023-04-17 DIAGNOSIS — I10 ESSENTIAL HYPERTENSION: Primary | ICD-10-CM

## 2023-04-17 RX ORDER — LOSARTAN POTASSIUM 50 MG/1
50 TABLET ORAL DAILY
Qty: 90 TABLET | Refills: 1 | Status: SHIPPED | OUTPATIENT
Start: 2023-04-17

## 2023-06-27 ENCOUNTER — NURSE TRIAGE (OUTPATIENT)
Dept: OTHER | Facility: CLINIC | Age: 53
End: 2023-06-27

## 2023-09-13 SDOH — HEALTH STABILITY: PHYSICAL HEALTH: ON AVERAGE, HOW MANY MINUTES DO YOU ENGAGE IN EXERCISE AT THIS LEVEL?: 30 MIN

## 2023-09-13 SDOH — HEALTH STABILITY: PHYSICAL HEALTH: ON AVERAGE, HOW MANY DAYS PER WEEK DO YOU ENGAGE IN MODERATE TO STRENUOUS EXERCISE (LIKE A BRISK WALK)?: 5 DAYS

## 2023-09-14 ENCOUNTER — OFFICE VISIT (OUTPATIENT)
Dept: PRIMARY CARE CLINIC | Facility: CLINIC | Age: 53
End: 2023-09-14
Payer: MEDICAID

## 2023-09-14 ENCOUNTER — HOSPITAL ENCOUNTER (OUTPATIENT)
Facility: HOSPITAL | Age: 53
Discharge: HOME | End: 2023-09-14
Payer: MEDICAID

## 2023-09-14 VITALS
TEMPERATURE: 98.2 F | RESPIRATION RATE: 14 BRPM | SYSTOLIC BLOOD PRESSURE: 148 MMHG | HEART RATE: 77 BPM | HEIGHT: 65 IN | BODY MASS INDEX: 43.99 KG/M2 | DIASTOLIC BLOOD PRESSURE: 88 MMHG | OXYGEN SATURATION: 100 % | WEIGHT: 264 LBS

## 2023-09-14 DIAGNOSIS — K64.9 HEMORRHOIDS, UNSPECIFIED HEMORRHOID TYPE: ICD-10-CM

## 2023-09-14 DIAGNOSIS — I10 PRIMARY HYPERTENSION: ICD-10-CM

## 2023-09-14 DIAGNOSIS — Z85.41 HISTORY OF CERVICAL CANCER: ICD-10-CM

## 2023-09-14 DIAGNOSIS — K57.30 DIVERTICULOSIS OF COLON: ICD-10-CM

## 2023-09-14 DIAGNOSIS — K11.5 SIALOLITHIASIS OF SUBMANDIBULAR GLAND: ICD-10-CM

## 2023-09-14 DIAGNOSIS — F95.9 FACIAL TIC: ICD-10-CM

## 2023-09-14 DIAGNOSIS — M54.2 NECK PAIN: Primary | ICD-10-CM

## 2023-09-14 DIAGNOSIS — Z90.5 HISTORY OF RIGHT NEPHRECTOMY: ICD-10-CM

## 2023-09-14 DIAGNOSIS — R25.3 FASCICULATIONS: ICD-10-CM

## 2023-09-14 DIAGNOSIS — R73.03 PREDIABETES: ICD-10-CM

## 2023-09-14 DIAGNOSIS — R51.9 NONINTRACTABLE HEADACHE, UNSPECIFIED CHRONICITY PATTERN, UNSPECIFIED HEADACHE TYPE: ICD-10-CM

## 2023-09-14 DIAGNOSIS — M54.2 NECK PAIN: ICD-10-CM

## 2023-09-14 PROCEDURE — 3077F SYST BP >= 140 MM HG: CPT | Performed by: INTERNAL MEDICINE

## 2023-09-14 PROCEDURE — 74018 RADEX ABDOMEN 1 VIEW: CPT

## 2023-09-14 PROCEDURE — 3079F DIAST BP 80-89 MM HG: CPT | Performed by: INTERNAL MEDICINE

## 2023-09-14 PROCEDURE — 99204 OFFICE O/P NEW MOD 45 MIN: CPT | Performed by: INTERNAL MEDICINE

## 2023-09-14 PROCEDURE — 72050 X-RAY EXAM NECK SPINE 4/5VWS: CPT

## 2023-09-14 RX ORDER — METFORMIN HYDROCHLORIDE 500 MG/1
500 TABLET, EXTENDED RELEASE ORAL
Qty: 90 TABLET | Refills: 1 | Status: SHIPPED | OUTPATIENT
Start: 2023-09-14

## 2023-09-14 NOTE — PROGRESS NOTES
Estefany Meeks is a 48 y.o.  female and presents with     Chief Complaint   Patient presents with    Establish Care     Pt is here to establish care. Has h/o HTN, s/p rt nephrectomy, predaibetes, cervical cancer  Checks blood pressure at home and it is normal, sometimes slightly elevated  Gets headaches. Has pain on the left side  Gets tics on her face  Fees like piece of hair has fallen on her face. Left side of her body feels weird. Gets fasciculations in her left leg. NO FH for   Had stones removed from the mouth,  Colonoscopy 2021 showed - diverticulsosis          Past Medical History:   Diagnosis Date    Arthritis     History of removal of neck cyst     left side;currently taking antibiotics. patient stated cyst burst    Hypertension     Ill-defined condition     history of chemotherapy and radiation    Insomnia 8/5/2020    Malignant tumor of cervix ([address]) 8/5/2020     Past Surgical History:   Procedure Laterality Date    BREAST REDUCTION SURGERY Bilateral 2006    COLONOSCOPY N/A 10/1/2021    COLONOSCOPY performed by Geovanny Morgan MD at [facility] ENDOSCOPY-DUE 2026    CYST REMOVAL  12/22/2021    back of neck    HYSTERECTOMY (CERVIX STATUS UNKNOWN)      KIDNEY REMOVAL Right 2012     Current Outpatient Medications   Medication Sig    metFORMIN (GLUCOPHAGE-XR) 500 MG extended release tablet Take 1 tablet by mouth daily (with breakfast)    losartan (COZAAR) 50 MG tablet Take 50 mg by mouth daily     No current facility-administered medications for this visit.      Health Maintenance   Topic Date Due    Hepatitis B vaccine (1 of 3 - 3-dose series) Never done    HIV screen  Never done    DTaP/Tdap/Td vaccine (1 - Tdap) Never done    Shingles vaccine (1 of 2) Never done    COVID-19 Vaccine (5 - Pfizer series) 11/23/2022    Cervical cancer screen  02/09/2023    Flu vaccine (1) Never done    A1C test (Diabetic or Prediabetic)  01/26/2024    Depression Screen  02/27/2024    Breast cancer screen  02/02/2025    Colorectal

## 2023-09-18 ENCOUNTER — HOSPITAL ENCOUNTER (OUTPATIENT)
Facility: HOSPITAL | Age: 53
Discharge: HOME | End: 2023-09-21
Payer: MEDICAID

## 2023-09-18 DIAGNOSIS — R25.3 FASCICULATIONS: ICD-10-CM

## 2023-09-18 DIAGNOSIS — R51.9 NONINTRACTABLE HEADACHE, UNSPECIFIED CHRONICITY PATTERN, UNSPECIFIED HEADACHE TYPE: ICD-10-CM

## 2023-09-18 DIAGNOSIS — I10 PRIMARY HYPERTENSION: ICD-10-CM

## 2023-09-18 DIAGNOSIS — F95.9 FACIAL TIC: ICD-10-CM

## 2023-09-18 PROCEDURE — 70450 CT HEAD/BRAIN W/O DYE: CPT

## 2023-09-21 DIAGNOSIS — R10.12 LEFT UPPER QUADRANT ABDOMINAL PAIN: ICD-10-CM

## 2023-09-21 DIAGNOSIS — F95.9 FACIAL TIC: Primary | ICD-10-CM

## 2023-09-21 DIAGNOSIS — K57.30 DIVERTICULOSIS OF COLON: ICD-10-CM

## 2023-10-16 ENCOUNTER — HOSPITAL ENCOUNTER (OUTPATIENT)
Facility: HOSPITAL | Age: 53
Discharge: HOME | End: 2023-10-19
Payer: MEDICAID

## 2023-10-16 DIAGNOSIS — K57.30 DIVERTICULOSIS OF COLON: ICD-10-CM

## 2023-10-16 DIAGNOSIS — R10.12 LEFT UPPER QUADRANT ABDOMINAL PAIN: ICD-10-CM

## 2023-10-16 LAB — CREAT BLD-MCNC: 1 MG/DL (ref 0.6–1.3)

## 2023-10-16 PROCEDURE — 6360000004 HC RX CONTRAST MEDICATION: Performed by: INTERNAL MEDICINE

## 2023-10-16 PROCEDURE — 82565 ASSAY OF CREATININE: CPT

## 2023-10-16 PROCEDURE — 74177 CT ABD & PELVIS W/CONTRAST: CPT

## 2023-10-16 RX ADMIN — IOPAMIDOL 100 ML: 755 INJECTION, SOLUTION INTRAVENOUS at 11:39

## 2023-11-08 ENCOUNTER — TELEPHONE (OUTPATIENT)
Age: 53
End: 2023-11-08

## 2023-11-08 NOTE — TELEPHONE ENCOUNTER
Called and left a message for the patient. We need her visit to be rescheduled with an ultrasound to another afternoon. If patient calls back, please reschedule her Friday (11/10) afternoon at 2:00 for her ultrasound and a visit with Dr. Candi Voss at 2:40.

## 2023-11-15 ENCOUNTER — OFFICE VISIT (OUTPATIENT)
Age: 53
End: 2023-11-15

## 2023-11-15 VITALS
DIASTOLIC BLOOD PRESSURE: 86 MMHG | WEIGHT: 256 LBS | HEIGHT: 65 IN | SYSTOLIC BLOOD PRESSURE: 138 MMHG | BODY MASS INDEX: 42.65 KG/M2

## 2023-11-15 DIAGNOSIS — N83.202 CYST OF LEFT OVARY: Primary | ICD-10-CM

## 2023-11-15 NOTE — PROGRESS NOTES
Problem Visit    Chief Complaint   Patient presents with    New Patient    Ovarian Cyst     Sherral Kayser is a 48 y.o.  presenting for problem visit. Her main concern today is following up from acute left flankpain. She has a medical history significant for cervical cancer s/p radical hysterectomy, chemotherapy and radiation. She underwent surgery with Dr. Vi Love. She completed chemoradiation in . She notes that there was concern for kidney metastasis and she underwent right nephrectomy in . Pathology from this surgery was benign- no evidence of metastases. She denies pelvic or lower abdominal pain. She had a CT A/P performed due to acute left sided pain. CT A/P demonstrated bilateral ovarian cysts, largest 3 cm on left side. She presents today for follow up of ovarian cyst.    Per US today-TV [address] PER PATIENT. RIGHT OVARY NOT VISUALIZED DUE TO BOWEL GAS. RIGHT ADNEXA APPEARS WNL. LEFT OVARY APPEARS WNL    She declines a chaperone during the gynecologic exam today. Ob/Gyn Hx:    1 TAB- 4 vaginal deliveries. LMP-hyst  Menses- hsyt  Contraception-hyst  SA-yes- male partner. Past Medical History:   Diagnosis Date    Arthritis     History of removal of neck cyst     left side;currently taking antibiotics.  patient stated cyst burst    Hypertension     Ill-defined condition     history of chemotherapy and radiation    Insomnia 2020    Malignant tumor of cervix ([address]) 2020       Past Surgical History:   Procedure Laterality Date    BREAST REDUCTION SURGERY Bilateral     COLONOSCOPY N/A 10/1/2021    COLONOSCOPY performed by Porsha Naik MD at [facility] ENDOSCOPY-DUE     CYST REMOVAL  2021    back of neck    HYSTERECTOMY (CERVIX STATUS UNKNOWN)      KIDNEY REMOVAL Right        Family History   Problem Relation Age of Onset    Cancer Maternal Grandmother         lung    Diabetes Sister     Hypertension Father     Hypertension

## 2024-01-05 ENCOUNTER — TRANSCRIBE ORDERS (OUTPATIENT)
Facility: HOSPITAL | Age: 54
End: 2024-01-05

## 2024-01-05 DIAGNOSIS — Z12.31 VISIT FOR SCREENING MAMMOGRAM: Primary | ICD-10-CM

## 2024-01-22 ENCOUNTER — OFFICE VISIT (OUTPATIENT)
Age: 54
End: 2024-01-22
Payer: MEDICAID

## 2024-01-22 VITALS — BODY MASS INDEX: 43.27 KG/M2 | WEIGHT: 260 LBS

## 2024-01-22 DIAGNOSIS — N95.1 VASOMOTOR SYMPTOMS DUE TO MENOPAUSE: Primary | ICD-10-CM

## 2024-01-22 PROCEDURE — 99213 OFFICE O/P EST LOW 20 MIN: CPT | Performed by: OBSTETRICS & GYNECOLOGY

## 2024-01-22 RX ORDER — ESTRADIOL 0.04 MG/D
1 FILM, EXTENDED RELEASE TRANSDERMAL
Qty: 8 PATCH | Refills: 2 | Status: SHIPPED | OUTPATIENT
Start: 2024-01-22

## 2024-01-22 NOTE — PROGRESS NOTES
Problem Visit    Chief Complaint   Patient presents with    Menopause     Andre Wilson is a 54 y.o.  presenting for problem visit. Her main concern today is hot flashes. She does have a history of cervical cancer s/p radical hysterectomy, unilateral nephrectomy, chemoradiation. She notes no symptoms since completing treatment.    She notes that hot flashes started a few months ago and have been significant. She notes that they are happening multiple times per day and multiple times per night. She notes that she gets hot even not turning her heat on. She denies any history of VTE/stroke. She denies any history of breast cancer.    She declines a chaperone during the gynecologic exam today.     Ob/Gyn Hx:  G4 P 4 -  LMP-n/a hyst  Menses- hysterectomy  Contraception-n/a    Past Medical History:   Diagnosis Date    Arthritis     History of removal of neck cyst     left side;currently taking antibiotics. patient stated cyst burst    Hypertension     Ill-defined condition     history of chemotherapy and radiation    Insomnia 8/5/2020    Malignant tumor of cervix (HCC) 8/5/2020       Past Surgical History:   Procedure Laterality Date    BREAST REDUCTION SURGERY Bilateral 2006    COLONOSCOPY N/A 10/1/2021    COLONOSCOPY performed by Blaze Yusuf MD at [facility] ENDOSCOPY-DUE 2026    CYST REMOVAL  12/22/2021    back of neck    HYSTERECTOMY (CERVIX STATUS UNKNOWN)      cervix removed due to cancer    KIDNEY REMOVAL Right 2012       Family History   Problem Relation Age of Onset    Cancer Maternal Grandmother         lung    Diabetes Sister     Hypertension Father     Hypertension Mother     Breast Cancer Paternal Aunt 50    Cancer Paternal Grandmother         breast    Breast Cancer Paternal Grandmother 60       Social History     Socioeconomic History    Marital status: Single     Spouse name: Not on file    Number of children: Not on file    Years of education: Not on file    Highest education level: Not on file

## 2024-01-29 ENCOUNTER — OFFICE VISIT (OUTPATIENT)
Dept: PRIMARY CARE CLINIC | Facility: CLINIC | Age: 54
End: 2024-01-29
Payer: MEDICAID

## 2024-01-29 ENCOUNTER — TELEPHONE (OUTPATIENT)
Age: 54
End: 2024-01-29

## 2024-01-29 VITALS
OXYGEN SATURATION: 98 % | DIASTOLIC BLOOD PRESSURE: 94 MMHG | TEMPERATURE: 97.8 F | BODY MASS INDEX: 44.65 KG/M2 | RESPIRATION RATE: 18 BRPM | HEIGHT: 65 IN | HEART RATE: 72 BPM | SYSTOLIC BLOOD PRESSURE: 137 MMHG | WEIGHT: 268 LBS

## 2024-01-29 DIAGNOSIS — K59.00 CONSTIPATION, UNSPECIFIED CONSTIPATION TYPE: ICD-10-CM

## 2024-01-29 DIAGNOSIS — L30.9 ECZEMA, UNSPECIFIED TYPE: ICD-10-CM

## 2024-01-29 DIAGNOSIS — I10 PRIMARY HYPERTENSION: Primary | ICD-10-CM

## 2024-01-29 DIAGNOSIS — R73.03 PREDIABETES: ICD-10-CM

## 2024-01-29 DIAGNOSIS — E66.01 CLASS 3 SEVERE OBESITY DUE TO EXCESS CALORIES WITHOUT SERIOUS COMORBIDITY WITH BODY MASS INDEX (BMI) OF 40.0 TO 44.9 IN ADULT (HCC): ICD-10-CM

## 2024-01-29 PROCEDURE — 99214 OFFICE O/P EST MOD 30 MIN: CPT | Performed by: INTERNAL MEDICINE

## 2024-01-29 PROCEDURE — 3080F DIAST BP >= 90 MM HG: CPT | Performed by: INTERNAL MEDICINE

## 2024-01-29 PROCEDURE — 3075F SYST BP GE 130 - 139MM HG: CPT | Performed by: INTERNAL MEDICINE

## 2024-01-29 RX ORDER — TRIAMCINOLONE ACETONIDE 1 MG/G
CREAM TOPICAL
Qty: 80 G | Refills: 3 | Status: SHIPPED | OUTPATIENT
Start: 2024-01-29

## 2024-01-29 RX ORDER — POLYETHYLENE GLYCOL 3350 17 G/17G
17 POWDER, FOR SOLUTION ORAL DAILY
Qty: 1530 G | Refills: 1 | Status: SHIPPED | OUTPATIENT
Start: 2024-01-29 | End: 2024-02-01

## 2024-01-29 ASSESSMENT — PATIENT HEALTH QUESTIONNAIRE - PHQ9
SUM OF ALL RESPONSES TO PHQ9 QUESTIONS 1 & 2: 0
SUM OF ALL RESPONSES TO PHQ QUESTIONS 1-9: 0
1. LITTLE INTEREST OR PLEASURE IN DOING THINGS: 0
SUM OF ALL RESPONSES TO PHQ QUESTIONS 1-9: 0
SUM OF ALL RESPONSES TO PHQ QUESTIONS 1-9: 0
2. FEELING DOWN, DEPRESSED OR HOPELESS: 0
SUM OF ALL RESPONSES TO PHQ QUESTIONS 1-9: 0

## 2024-01-29 NOTE — PROGRESS NOTES
\"Have you been to the ER, urgent care clinic since your last visit?  Hospitalized since your last visit?\"    NO    “Have you seen or consulted any other health care providers outside of [facility] since your last visit?”    NO        “Have you had a pap smear?”    YES - Where: [facility] Nurse/ANGELA to request most recent records if not in the chart

## 2024-01-29 NOTE — PROGRESS NOTES
Andre Wilson is a 54 y.o.  female and presents with     Chief Complaint   Patient presents with    Weight Loss     Patient has questions and concerns about weight loss     Pt is taking metfromin and that did not seem to help her with wt loss.  She wants to consider wt loss surgery/  Pt has HTN and prediabetes  Mother has CAD.  Complains of itching on her back  Also has constipation          Past Medical History:   Diagnosis Date    Anxiety April 2023    Arthritis     History of removal of neck cyst     left side;currently taking antibiotics. patient stated cyst burst    Hypertension     Ill-defined condition     history of chemotherapy and radiation    Insomnia 8/5/2020    Malignant tumor of cervix (HCC) 8/5/2020    Obesity 2022     Past Surgical History:   Procedure Laterality Date    BREAST REDUCTION SURGERY Bilateral 2006    COLONOSCOPY N/A 10/1/2021    COLONOSCOPY performed by Blaze Yusuf MD at [facility] ENDOSCOPY-DUE 2026    COSMETIC SURGERY  2007    Breast reduction    CYST REMOVAL  12/22/2021    back of neck    HYSTERECTOMY (CERVIX STATUS UNKNOWN)      cervix removed due to cancer    HYSTERECTOMY, TOTAL ABDOMINAL (CERVIX REMOVED)  2010    Ovaries intact    KIDNEY REMOVAL Right 2012     Current Outpatient Medications   Medication Sig    triamcinolone (KENALOG) 0.1 % cream Apply topically 2 times daily.    polyethylene glycol (GLYCOLAX) 17 GM/SCOOP powder Take 17 g by mouth daily    estradiol (VIVELLE-DOT) 0.0375 MG/24HR Place 1 patch onto the skin Twice a Week    losartan (COZAAR) 50 MG tablet Take 1 tablet by mouth daily    metFORMIN (GLUCOPHAGE-XR) 500 MG extended release tablet Take 1 tablet by mouth daily (with breakfast)     No current facility-administered medications for this visit.     Health Maintenance   Topic Date Due    Hepatitis B vaccine (1 of 3 - 3-dose series) Never done    HIV screen  Never done    DTaP/Tdap/Td vaccine (1 - Tdap) Never done    Shingles vaccine (1 of 2) Never done    Cervical

## 2024-02-01 ENCOUNTER — OFFICE VISIT (OUTPATIENT)
Age: 54
End: 2024-02-01
Payer: MEDICAID

## 2024-02-01 VITALS
WEIGHT: 264.8 LBS | OXYGEN SATURATION: 98 % | HEIGHT: 65 IN | BODY MASS INDEX: 44.12 KG/M2 | DIASTOLIC BLOOD PRESSURE: 100 MMHG | RESPIRATION RATE: 18 BRPM | SYSTOLIC BLOOD PRESSURE: 179 MMHG | HEART RATE: 78 BPM | TEMPERATURE: 98.3 F

## 2024-02-01 DIAGNOSIS — R40.0 DAYTIME SOMNOLENCE: ICD-10-CM

## 2024-02-01 DIAGNOSIS — R06.83 SNORES: ICD-10-CM

## 2024-02-01 DIAGNOSIS — R14.0 ABDOMINAL BLOATING: ICD-10-CM

## 2024-02-01 DIAGNOSIS — I10 ESSENTIAL HYPERTENSION: ICD-10-CM

## 2024-02-01 DIAGNOSIS — Z01.818 PRE-OP TESTING: ICD-10-CM

## 2024-02-01 DIAGNOSIS — R73.03 PRE-DIABETES: ICD-10-CM

## 2024-02-01 DIAGNOSIS — E66.01 MORBID OBESITY WITH BMI OF 40.0-44.9, ADULT (HCC): Primary | ICD-10-CM

## 2024-02-01 LAB
BASOPHILS # BLD AUTO: 0 X10E3/UL (ref 0–0.2)
BASOPHILS NFR BLD AUTO: 1 %
EOSINOPHIL # BLD AUTO: 0.1 X10E3/UL (ref 0–0.4)
EOSINOPHIL NFR BLD AUTO: 2 %
ERYTHROCYTE [DISTWIDTH] IN BLOOD BY AUTOMATED COUNT: 13.1 % (ref 11.7–15.4)
HCT VFR BLD AUTO: 41.5 % (ref 34–46.6)
HGB BLD-MCNC: 13.9 G/DL (ref 11.1–15.9)
IMM GRANULOCYTES # BLD AUTO: 0 X10E3/UL (ref 0–0.1)
IMM GRANULOCYTES NFR BLD AUTO: 0 %
LYMPHOCYTES # BLD AUTO: 2.5 X10E3/UL (ref 0.7–3.1)
LYMPHOCYTES NFR BLD AUTO: 49 %
MCH RBC QN AUTO: 29.4 PG (ref 26.6–33)
MCHC RBC AUTO-ENTMCNC: 33.5 G/DL (ref 31.5–35.7)
MCV RBC AUTO: 88 FL (ref 79–97)
MONOCYTES # BLD AUTO: 0.5 X10E3/UL (ref 0.1–0.9)
MONOCYTES NFR BLD AUTO: 9 %
NEUTROPHILS # BLD AUTO: 2.1 X10E3/UL (ref 1.4–7)
NEUTROPHILS NFR BLD AUTO: 39 %
PLATELET # BLD AUTO: 211 X10E3/UL (ref 150–450)
RBC # BLD AUTO: 4.73 X10E6/UL (ref 3.77–5.28)
WBC # BLD AUTO: 5.2 X10E3/UL (ref 3.4–10.8)

## 2024-02-01 PROCEDURE — 3077F SYST BP >= 140 MM HG: CPT | Performed by: NURSE PRACTITIONER

## 2024-02-01 PROCEDURE — 99215 OFFICE O/P EST HI 40 MIN: CPT | Performed by: NURSE PRACTITIONER

## 2024-02-01 PROCEDURE — 3080F DIAST BP >= 90 MM HG: CPT | Performed by: NURSE PRACTITIONER

## 2024-02-01 ASSESSMENT — PATIENT HEALTH QUESTIONNAIRE - PHQ9
2. FEELING DOWN, DEPRESSED OR HOPELESS: 0
SUM OF ALL RESPONSES TO PHQ QUESTIONS 1-9: 0
SUM OF ALL RESPONSES TO PHQ9 QUESTIONS 1 & 2: 0
SUM OF ALL RESPONSES TO PHQ QUESTIONS 1-9: 0
1. LITTLE INTEREST OR PLEASURE IN DOING THINGS: 0
SUM OF ALL RESPONSES TO PHQ QUESTIONS 1-9: 0

## 2024-02-01 NOTE — PROGRESS NOTES
Identified pt with two pt identifiers(name and ). Reviewed record in preparation for visit and have obtained necessary documentation. All patient medications has been reviewed.    Chief Complaint   Patient presents with    New Patient     New Bariatric       Health Maintenance Due   Topic    Hepatitis B vaccine (1 of 3 - 3-dose series)    HIV screen     DTaP/Tdap/Td vaccine (1 - Tdap)    Shingles vaccine (1 of 2)    Cervical cancer screen     Flu vaccine (1)    COVID-19 Vaccine ( season)    A1C test (Diabetic or Prediabetic)        Vitals:    24 1010   BP: (!) 179/100   Site: Right Lower Arm   Position: Sitting   Pulse: 78   Resp: 18   Temp: 98.3 °F (36.8 °C)   TempSrc: Oral   SpO2: 98%   Weight: 120.1 kg (264 lb 12.8 oz)   Height: 1.651 m (5' 5\")         4.Have you been to the ER, urgent care clinic since your last visit?  Hospitalized since your last visit? No      5. Have you seen or consulted any other health care providers outside of the [facility] since your last visit?  Include any pap smears or colon screening. No      Patient is accompanied by self I have received verbal consent from Andre Wilson to discuss any/all medical information while they are present in the room.     Patient informed me she just took her medication before leaving the house   Pt reports vaginal bleeding after intercourse x 3 weeks.  Bleeding occurs for one day and then starts again after intercourse.  Pt not presently bleeding, she denies having symptoms at this time.     Disposition:  See a provider within 2 weeks, in-person.  Advised her to call [facility] tomorrow to schedule an appt with Gynecology.  She verbalized understanding and had no further questions.     COVID 19 Nurse Triage Plan/Patient Instructions    Please be aware that novel coronavirus (COVID-19) may be circulating in the community. If you develop symptoms such as fever, cough, or SOB or if you have concerns about the presence of another infection including coronavirus (COVID-19), please contact your health care provider or visit [email or web address].     Disposition/Instructions    In-Person Visit with provider recommended. Reference Visit Selection Guide.    Thank you for taking steps to prevent the spread of this virus.  o Limit your contact with others.  o Wear a simple mask to cover your cough.  o Wash your hands well and often.    Resources    [facility]: About COVID-19: [email or web address]    CDC: What to Do If You're Sick: www.cdc.gov/coronavirus/2019-ncov/about/steps-when-sick.html    CDC: Ending Home Isolation: www.cdc.gov/coronavirus/2019-ncov/hcp/disposition-in-home-patients.html     CDC: Caring for Someone: www.cdc.gov/coronavirus/2019-ncov/if-you-are-sick/care-for-someone.html     [facility]: Interim Guidance for Hospital Discharge to Home: www.health.On [facility] of [facility].mn.us/diseases/coronavirus/hcp/hospdischarge.pdf    [facility] clinical trials (COVID-19 research studies): clinicalaffairs.[facility]/n-clinical-trials     Below are the COVID-19 hotlines at the [facility] ([facility]). Interpreters are available.   o For health questions: Call 007-292-8280 or 1-837.269.6042 (7 a.m. to 7 p.m.)  o For questions about schools and childcare: Call 451-129-5949 or 1-113.704.5392 (7 a.m.  to 7 p.m.)             Clara Jordan RN/FNA          Reason for Disposition    Bleeding or spotting occurs after sex (Exception: first intercourse)    Additional Information    Negative: Shock suspected (e.g., cold/pale/clammy skin, too weak to stand, low BP, rapid pulse)    Negative: Difficult to awaken or acting confused (e.g., disoriented, slurred speech)    Negative: Passed out (i.e., lost consciousness, collapsed and was not responding)    Negative: Sounds like a life-threatening emergency to the triager    Negative: SEVERE abdominal pain    Negative: SEVERE dizziness (e.g., unable to stand, requires support to walk, feels like passing out now)    Negative: SEVERE vaginal bleeding (i.e., soaking 2 pads or tampons per hour and present 2 or more hours; 1 menstrual cup every 2 hours)    Negative: Patient sounds very sick or weak to the triager    Negative: MODERATE vaginal bleeding (i.e., soaking 1 pad or tampon per hour and present > 6 hours; 1 menstrual cup every 6 hours)    Negative: [1] Constant abdominal pain AND [2] present > 2 hours    Negative: Pale skin (pallor) of new onset or worsening    Negative: Passed tissue (e.g., gray-white)    Negative: Taking Coumadin (warfarin) or other strong blood thinner, or known bleeding disorder (e.g., thrombocytopenia)    Negative: [1] Skin bruises or nosebleed AND [2] not caused by an injury    Negative: [1] Periods with > 6 soaked pads or tampons per day AND [2] last > 7 days    Negative: [1] Bleeding or spotting after procedure (e.g., biopsy) or pelvic examination (e.g., pap smear) AND [2] lasts > 7 days    Negative: Periods with > 6 soaked pads or tampons per day    Negative: Periods last > 7 days    Negative: [1] Uses menstrual cups AND [2] more than 80 ml blood per menstrual period.    Negative: [1] Missed period AND [2] has occurred 2 or more times in the last year    Negative: [1] Menstrual cycle < 21 days OR > 35 days AND [2] occurs more than two cycles (2  months) this past year    Negative: [1] Bleeding or spotting between regular periods AND [2] occurs more than three cycles (3 months) this past year    Negative: [1] Bleeding or spotting between regular periods AND [2] occurs more than three cycles (3 months) AND [3] using birth control medicine (pills, patch, Depo-Provera, Implanon, vaginal ring, Mirena IUD)    Negative: Bleeding or spotting occurs after hysterectomy    Protocols used: VAGINAL BLEEDING - [id number]

## 2024-02-02 ENCOUNTER — TELEPHONE (OUTPATIENT)
Age: 54
End: 2024-02-02

## 2024-02-02 ENCOUNTER — OFFICE VISIT (OUTPATIENT)
Age: 54
End: 2024-02-02

## 2024-02-02 DIAGNOSIS — E66.01 MORBID OBESITY (HCC): Primary | ICD-10-CM

## 2024-02-02 LAB
ALBUMIN SERPL-MCNC: 4.1 G/DL (ref 3.8–4.9)
ALBUMIN/GLOB SERPL: 1.5 {RATIO} (ref 1.2–2.2)
ALP SERPL-CCNC: 59 IU/L (ref 44–121)
ALT SERPL-CCNC: 23 IU/L (ref 0–32)
AST SERPL-CCNC: 17 IU/L (ref 0–40)
BILIRUB SERPL-MCNC: 0.6 MG/DL (ref 0–1.2)
BUN SERPL-MCNC: 13 MG/DL (ref 6–24)
BUN/CREAT SERPL: 13 (ref 9–23)
CALCIUM SERPL-MCNC: 10.3 MG/DL (ref 8.7–10.2)
CHLORIDE SERPL-SCNC: 105 MMOL/L (ref 96–106)
CO2 SERPL-SCNC: 25 MMOL/L (ref 20–29)
CREAT SERPL-MCNC: 1 MG/DL (ref 0.57–1)
EGFRCR SERPLBLD CKD-EPI 2021: 68 ML/MIN/1.73
GLOBULIN SER CALC-MCNC: 2.7 G/DL (ref 1.5–4.5)
GLUCOSE SERPL-MCNC: 86 MG/DL (ref 70–99)
HBA1C MFR BLD: 5.9 % (ref 4.8–5.6)
IRON SERPL-MCNC: 85 UG/DL (ref 27–159)
POTASSIUM SERPL-SCNC: 4.6 MMOL/L (ref 3.5–5.2)
PROT SERPL-MCNC: 6.8 G/DL (ref 6–8.5)
SODIUM SERPL-SCNC: 139 MMOL/L (ref 134–144)
T4 FREE SERPL-MCNC: 1.18 NG/DL (ref 0.82–1.77)
TSH SERPL DL<=0.005 MIU/L-ACNC: 0.61 UIU/ML (ref 0.45–4.5)
UREA BREATH TEST QL: NEGATIVE

## 2024-02-02 ASSESSMENT — ENCOUNTER SYMPTOMS
CHOKING: 0
SHORTNESS OF BREATH: 0
CHEST TIGHTNESS: 0

## 2024-02-02 NOTE — PROGRESS NOTES
fat cooking methods. Avoid foods with sugar listed in the first 3 ingredients and >/15 g sugar per serving. Excess sugar/fat intake may lead to dumping syndrome. Discussed signs and symptoms of dumping syndrome.     Practice mindful eating habits; take small bites, chew thoroughly, avoid distractions, utilize hunger/fullness scale. Consume meals over 20-30 minutes.     Attend Bariatric Support Group and increase physical activity (approved per MD) for long term weight maintenance.      NUTRITION MONITORING AND EVALUATION:    The following goals were established with patient;  Increase protein intake. Add protein to each meal or snack. Examples discussed.   Review list of protein shakes. Try Fair Life protein shake and may want to try it frozen. May need to try milk-aversion shakes for tolerance.   Print and review nutrition education materials provided. Follow up next month for continued nutrition education and supervised weight loss.         Specific tips and techniques to facilitate compliance with above recommendations were provided and discussed. Nutrition evaluation reveals important lifestyle changes indicated.  Goals set and recommendations made. Will continue to assess. If further details are desired please feel free to contact me at [phone].  This phone number was also provided to the patient for any further questions or concerns.           Judy San RD

## 2024-02-02 NOTE — PROGRESS NOTES
Chief Complaint   Patient presents with    New Patient     New Bariatric       Andre Wilson is a new patient seeking surgical treatment for morbid obesity.  Patient has had obesity for > 10 years.  Her sister had gastric bypass and is doing ok \"she eats terrible, but is fine\".     Referred by self   Body mass index is 44.07 kg/m².     Seminar  On line     Dietary Habits 3 meals   Recall:  B- breakfast bar + coffee   L-veggies and fruit (packs lunch)  D-waffle house sandwich   Craves sugar and likes cakes      Liquids  Water, unsweetened tea, coffee(24 oz)     She has been juicing recently and \"really likes it\"     No ETOH and no soda     Exercise  None     Previous weight reduction efforts    _x__ Unsupervised diets  ___ Weight Watchers   ___ On line weight loss programs   ___ Medically supervised weight loss   ___ Low carb (Keto, Atkins)  ___ Prescription obesity medication       Adult High weight  279 - 280 lbs   Adult Low weight  175 lbs (during chemo)      Surgical Risk factors:  General     Diabetes \"preDM\" Insulin NO     Current smoker within past 12 months  NO      Functional Health Status  YES Independent    Partially dependent    Totally dependent    Unknown   Pulmonary     COPD  NO   Oxygen dependent  NO     RHONDA (requiring CPAP/BiPap) NEVER TESTED       Gastrointestinal     GERD requiring medication past 30 days  NO      Musculoskeletal     Is ambulation limited most of the time or all the time NO     Cardiac    Previous MI NO        Hypertension requiring medication YES         Hyperlipidemia requiring medication NO     Vascular     History of DVT NO         Venous stasis NO     IVC filter  NO      Renal    Dialysis  NO    Renal insufficiency  NO     Other  Steroids/Immunosuppressants for chronic condition NO   Previous abdominal surgery     YES   Patient Active Problem List   Diagnosis    Malignant tumor of cervix (HCC)    Goiter    Insomnia    Abnormal thyroid function test    Lymphadenitis, acute

## 2024-02-03 LAB
FOLATE SERPL-MCNC: >20 NG/ML
VIT B12 SERPL-MCNC: 895 PG/ML (ref 232–1245)

## 2024-02-10 ENCOUNTER — HOSPITAL ENCOUNTER (OUTPATIENT)
Facility: HOSPITAL | Age: 54
End: 2024-02-10
Payer: MEDICAID

## 2024-02-10 DIAGNOSIS — Z12.31 VISIT FOR SCREENING MAMMOGRAM: ICD-10-CM

## 2024-02-10 PROCEDURE — 77063 BREAST TOMOSYNTHESIS BI: CPT

## 2024-02-14 ENCOUNTER — HOSPITAL ENCOUNTER (OUTPATIENT)
Facility: HOSPITAL | Age: 54
Discharge: HOME | End: 2024-02-17
Payer: MEDICAID

## 2024-02-14 DIAGNOSIS — R14.0 ABDOMINAL BLOATING: ICD-10-CM

## 2024-02-14 DIAGNOSIS — Z01.818 PRE-OP TESTING: ICD-10-CM

## 2024-02-14 DIAGNOSIS — E66.01 MORBID OBESITY WITH BMI OF 40.0-44.9, ADULT (HCC): ICD-10-CM

## 2024-02-14 PROCEDURE — 74246 X-RAY XM UPR GI TRC 2CNTRST: CPT

## 2024-02-22 ENCOUNTER — CLINICAL DOCUMENTATION (OUTPATIENT)
Age: 54
End: 2024-02-22

## 2024-02-22 NOTE — PROGRESS NOTES
UGI reviewed. New patient in process for bariatric surgery. I originally recommended RNY with her history, but she has requested the sleeve. To consider sleeve will need gastric emptying study and upper endoscopy for further evaluation.   Labs reviewed and letter sent.         EXAM: UPPER GI SERIES WITH AIR CONTRAST. .      RADIOGRAPH:  The bowel gas pattern is normal . Surgical clips project over  the right upper quadrant and the lower abdomen.     TECHNIQUE: The esophagus, stomach and duodenum were examined using air-contrast  and single-contrast technique. .     Fluoroscopy dose (air kerma):  278.89mGy.     FINDINGS:   Examination of the esophagus reveals normal anatomy, without hiatal hernia, or  esophagitis  . Spontaneous gastroesophageal reflux does occur minimally to the  midesophagus.     Examination of the stomach and duodenum reveals delayed gastric emptying, but no  active ulcer formation or other mucosal inflammatory change or mass .     Proximal small bowel loops are normal in position and anatomy.      IMPRESSION:  1. Spontaneous gastroesophageal reflux.  2. Delayed gastric emptying .   3. Otherwise unremarkable air contrast upper GI series.

## 2024-03-05 ENCOUNTER — APPOINTMENT (OUTPATIENT)
Facility: HOSPITAL | Age: 54
End: 2024-03-05
Payer: MEDICAID

## 2024-03-05 ENCOUNTER — HOSPITAL ENCOUNTER (EMERGENCY)
Facility: HOSPITAL | Age: 54
Discharge: HOME | End: 2024-03-05
Attending: EMERGENCY MEDICINE
Payer: MEDICAID

## 2024-03-05 VITALS
RESPIRATION RATE: 16 BRPM | TEMPERATURE: 98.1 F | SYSTOLIC BLOOD PRESSURE: 155 MMHG | OXYGEN SATURATION: 100 % | HEART RATE: 78 BPM | DIASTOLIC BLOOD PRESSURE: 87 MMHG

## 2024-03-05 DIAGNOSIS — K57.32 DIVERTICULITIS OF COLON: Primary | ICD-10-CM

## 2024-03-05 DIAGNOSIS — R93.5 ABNORMAL CT OF THE ABDOMEN: ICD-10-CM

## 2024-03-05 LAB
ALBUMIN SERPL-MCNC: 3.2 G/DL (ref 3.5–5)
ALBUMIN/GLOB SERPL: 0.7 (ref 1.1–2.2)
ALP SERPL-CCNC: 56 U/L (ref 45–117)
ALT SERPL-CCNC: 24 U/L (ref 12–78)
ANION GAP SERPL CALC-SCNC: 3 MMOL/L (ref 5–15)
APPEARANCE UR: CLEAR
AST SERPL-CCNC: 15 U/L (ref 15–37)
BACTERIA URNS QL MICRO: NEGATIVE /HPF
BASOPHILS # BLD: 0 K/UL (ref 0–0.1)
BASOPHILS NFR BLD: 1 % (ref 0–1)
BILIRUB SERPL-MCNC: 0.4 MG/DL (ref 0.2–1)
BILIRUB UR QL: NEGATIVE
BUN SERPL-MCNC: 11 MG/DL (ref 6–20)
BUN/CREAT SERPL: 11 (ref 12–20)
CALCIUM SERPL-MCNC: 10.8 MG/DL (ref 8.5–10.1)
CHLORIDE SERPL-SCNC: 109 MMOL/L (ref 97–108)
CO2 SERPL-SCNC: 25 MMOL/L (ref 21–32)
COLOR UR: NORMAL
COMMENT:: NORMAL
CREAT SERPL-MCNC: 1.01 MG/DL (ref 0.55–1.02)
DIFFERENTIAL METHOD BLD: NORMAL
EOSINOPHIL # BLD: 0.1 K/UL (ref 0–0.4)
EOSINOPHIL NFR BLD: 2 % (ref 0–7)
EPITH CASTS URNS QL MICRO: NORMAL /LPF
ERYTHROCYTE [DISTWIDTH] IN BLOOD BY AUTOMATED COUNT: 13.2 % (ref 11.5–14.5)
GLOBULIN SER CALC-MCNC: 4.7 G/DL (ref 2–4)
GLUCOSE SERPL-MCNC: 116 MG/DL (ref 65–100)
GLUCOSE UR STRIP.AUTO-MCNC: NEGATIVE MG/DL
HCT VFR BLD AUTO: 38.8 % (ref 35–47)
HGB BLD-MCNC: 13.4 G/DL (ref 11.5–16)
HGB UR QL STRIP: NEGATIVE
HYALINE CASTS URNS QL MICRO: NORMAL /LPF (ref 0–5)
IMM GRANULOCYTES # BLD AUTO: 0 K/UL (ref 0–0.04)
IMM GRANULOCYTES NFR BLD AUTO: 0 % (ref 0–0.5)
KETONES UR QL STRIP.AUTO: NEGATIVE MG/DL
LEUKOCYTE ESTERASE UR QL STRIP.AUTO: NEGATIVE
LIPASE SERPL-CCNC: 32 U/L (ref 13–75)
LYMPHOCYTES # BLD: 2.2 K/UL (ref 0.8–3.5)
LYMPHOCYTES NFR BLD: 38 % (ref 12–49)
MCH RBC QN AUTO: 30.1 PG (ref 26–34)
MCHC RBC AUTO-ENTMCNC: 34.5 G/DL (ref 30–36.5)
MCV RBC AUTO: 87.2 FL (ref 80–99)
MONOCYTES # BLD: 0.6 K/UL (ref 0–1)
MONOCYTES NFR BLD: 10 % (ref 5–13)
NEUTS SEG # BLD: 2.9 K/UL (ref 1.8–8)
NEUTS SEG NFR BLD: 49 % (ref 32–75)
NITRITE UR QL STRIP.AUTO: NEGATIVE
NRBC # BLD: 0 K/UL (ref 0–0.01)
NRBC BLD-RTO: 0 PER 100 WBC
PH UR STRIP: 6.5 (ref 5–8)
PLATELET # BLD AUTO: 308 K/UL (ref 150–400)
PMV BLD AUTO: 10.6 FL (ref 8.9–12.9)
POTASSIUM SERPL-SCNC: 3.9 MMOL/L (ref 3.5–5.1)
PROT SERPL-MCNC: 7.9 G/DL (ref 6.4–8.2)
PROT UR STRIP-MCNC: NEGATIVE MG/DL
RBC # BLD AUTO: 4.45 M/UL (ref 3.8–5.2)
RBC #/AREA URNS HPF: NORMAL /HPF (ref 0–5)
SODIUM SERPL-SCNC: 137 MMOL/L (ref 136–145)
SP GR UR REFRACTOMETRY: <1.005 (ref 1–1.03)
SPECIMEN HOLD: NORMAL
URINE CULTURE IF INDICATED: NORMAL
UROBILINOGEN UR QL STRIP.AUTO: 0.2 EU/DL (ref 0.2–1)
WBC # BLD AUTO: 5.8 K/UL (ref 3.6–11)
WBC URNS QL MICRO: NORMAL /HPF (ref 0–4)

## 2024-03-05 PROCEDURE — 6360000004 HC RX CONTRAST MEDICATION: Performed by: INTERNAL MEDICINE

## 2024-03-05 PROCEDURE — 85025 COMPLETE CBC W/AUTO DIFF WBC: CPT

## 2024-03-05 PROCEDURE — 6360000004 HC RX CONTRAST MEDICATION: Performed by: NURSE PRACTITIONER

## 2024-03-05 PROCEDURE — 6370000000 HC RX 637 (ALT 250 FOR IP): Performed by: NURSE PRACTITIONER

## 2024-03-05 PROCEDURE — 81001 URINALYSIS AUTO W/SCOPE: CPT

## 2024-03-05 PROCEDURE — 80053 COMPREHEN METABOLIC PANEL: CPT

## 2024-03-05 PROCEDURE — 99285 EMERGENCY DEPT VISIT HI MDM: CPT

## 2024-03-05 PROCEDURE — 83690 ASSAY OF LIPASE: CPT

## 2024-03-05 PROCEDURE — 36415 COLL VENOUS BLD VENIPUNCTURE: CPT

## 2024-03-05 PROCEDURE — 74177 CT ABD & PELVIS W/CONTRAST: CPT

## 2024-03-05 RX ORDER — AMOXICILLIN AND CLAVULANATE POTASSIUM 875; 125 MG/1; MG/1
1 TABLET, FILM COATED ORAL
Status: COMPLETED | OUTPATIENT
Start: 2024-03-05 | End: 2024-03-05

## 2024-03-05 RX ORDER — AMOXICILLIN AND CLAVULANATE POTASSIUM 875; 125 MG/1; MG/1
1 TABLET, FILM COATED ORAL 3 TIMES DAILY
Qty: 14 TABLET | Refills: 0 | Status: SHIPPED | OUTPATIENT
Start: 2024-03-05 | End: 2024-03-10

## 2024-03-05 RX ORDER — ONDANSETRON 4 MG/1
4 TABLET, ORALLY DISINTEGRATING ORAL 3 TIMES DAILY PRN
Qty: 16 TABLET | Refills: 0 | Status: SHIPPED | OUTPATIENT
Start: 2024-03-05

## 2024-03-05 RX ADMIN — IOPAMIDOL 100 ML: 755 INJECTION, SOLUTION INTRAVENOUS at 17:15

## 2024-03-05 RX ADMIN — IOPAMIDOL 100 ML: 755 INJECTION, SOLUTION INTRAVENOUS at 17:29

## 2024-03-05 RX ADMIN — AMOXICILLIN AND CLAVULANATE POTASSIUM 1 TABLET: 875; 125 TABLET, FILM COATED ORAL at 18:35

## 2024-03-05 ASSESSMENT — PAIN DESCRIPTION - ORIENTATION: ORIENTATION: LOWER

## 2024-03-05 ASSESSMENT — PAIN - FUNCTIONAL ASSESSMENT: PAIN_FUNCTIONAL_ASSESSMENT: 0-10

## 2024-03-05 ASSESSMENT — PAIN DESCRIPTION - PAIN TYPE: TYPE: ACUTE PAIN

## 2024-03-05 ASSESSMENT — PAIN SCALES - GENERAL: PAINLEVEL_OUTOF10: 7

## 2024-03-05 ASSESSMENT — PAIN DESCRIPTION - LOCATION: LOCATION: ABDOMEN

## 2024-03-05 NOTE — ED PROVIDER NOTES
[facility] EMERGENCY DEP  EMERGENCY DEPARTMENT ENCOUNTER      Pt Name: Andre Wilson  MRN: [id number]  Birthdate 1970  Date of evaluation: 3/5/2024  Provider: HUA Cheng - KELLI    CHIEF COMPLAINT       Chief Complaint   Patient presents with    Abdominal Pain         HISTORY OF PRESENT ILLNESS   (Location/Symptom, Timing/Onset, Context/Setting, Quality, Duration, Modifying Factors, Severity)  Note limiting factors.   53y/o female with past medical history of anxiety, arthritis, hypertension, insomnia, malignant tumor of the cervix and obesity.  Patient presents ambulatory to the ER for evaluation of Pressure to the lower abdomen, feels like she needs to use the bathroom frequently, x 1 week.  Patient denies any fever, chills, chest pain, shortness of breath, nausea, vomiting or diarrhea, denies difficulty urinating, dysuria, vaginal bleeding or vaginal discharge.  Patient states that she only has 1 kidney, has had a hysterectomy.    HX cervical cancer- still has ovaries.       The history is provided by the patient.         Review of External Medical Records:     Nursing Notes were reviewed.    REVIEW OF SYSTEMS    (2-9 systems for level 4, 10 or more for level 5)     Review of Systems    Except as noted above the remainder of the review of systems was reviewed and negative.       PAST MEDICAL HISTORY     Past Medical History:   Diagnosis Date    Anxiety April 2023    Arthritis     History of removal of neck cyst     left side;currently taking antibiotics. patient stated cyst burst    Hypertension     Ill-defined condition     history of chemotherapy and radiation    Insomnia 8/5/2020    Malignant tumor of cervix (HCC) 8/5/2020    Obesity 2022         SURGICAL HISTORY       Past Surgical History:   Procedure Laterality Date    BREAST REDUCTION SURGERY Bilateral 2006    COLONOSCOPY N/A 10/1/2021    COLONOSCOPY performed by Blaze Yusuf MD at [facility] ENDOSCOPY-DUE 2026    COSMETIC SURGERY  2007

## 2024-03-05 NOTE — DISCHARGE INSTRUCTIONS
For diverticulitis we recommend that you rest your bowels and progress slowly as tolerated.  Bowel rest is very important when you are dealing with diverticulitis, have sent you with a prescription for Zofran as needed for GI distress/nausea and vomiting as well as Augmentin for the diverticulitis.  Please call GI specialist and schedule follow-up appointment with a colonoscopy for further workup and evaluation.  Return to the ER with worsening of symptoms.          Thank you for allowing us to provide you with medical care today.  We realize that you have many choices for your emergency care needs.  We thank you for choosing [facility].  Please choose us in the future for any continued health care needs.     We hope we addressed all of your medical concerns. We strive to provide excellent quality care in the Emergency Department.  Anything less than excellent does not meet our expectations.     The exam and treatment you received in the Emergency Department were for an emergent problem and are not intended as complete care. It is important that you follow up with a doctor, nurse practitioner, or physician’s assistant for ongoing care. If your symptoms worsen or you do not improve as expected and you are unable to reach your usual health care provider, you should return to the Emergency Department. We are available 24 hours a day.     Take this sheet with you when you go to your follow-up visit.     If you have any problem arranging the follow-up visit, contact the Emergency Department immediately.     Make an appointment your family doctor for follow up of this visit. Return to the ER if you are unable to be seen in a timely manner.

## 2024-03-05 NOTE — ED TRIAGE NOTES
She is having lower abdominal pain that started a week ago. The pain is constant but increases in intensity at times. She has no back pain or urinary symptoms. She has had a hysterectomy in the past but still has her ovaries. No vaginal bleeding.

## 2024-03-15 ENCOUNTER — CLINICAL DOCUMENTATION (OUTPATIENT)
Age: 54
End: 2024-03-15

## 2024-03-15 ENCOUNTER — OFFICE VISIT (OUTPATIENT)
Age: 54
End: 2024-03-15
Payer: MEDICAID

## 2024-03-15 ENCOUNTER — OFFICE VISIT (OUTPATIENT)
Age: 54
End: 2024-03-15

## 2024-03-15 VITALS
DIASTOLIC BLOOD PRESSURE: 90 MMHG | OXYGEN SATURATION: 98 % | SYSTOLIC BLOOD PRESSURE: 148 MMHG | HEIGHT: 65 IN | HEART RATE: 81 BPM | BODY MASS INDEX: 41.99 KG/M2 | WEIGHT: 252 LBS

## 2024-03-15 DIAGNOSIS — E66.01 MORBID OBESITY (HCC): Primary | ICD-10-CM

## 2024-03-15 DIAGNOSIS — Z98.890 H/O CERVICAL SPINE SURGERY: ICD-10-CM

## 2024-03-15 DIAGNOSIS — M47.22 CERVICAL SPONDYLOSIS WITH RADICULOPATHY: Primary | ICD-10-CM

## 2024-03-15 PROCEDURE — 99204 OFFICE O/P NEW MOD 45 MIN: CPT | Performed by: PSYCHIATRY & NEUROLOGY

## 2024-03-15 NOTE — PROGRESS NOTES
Chief Complaint   Patient presents with    OTHER     Pt here for H/O facial tic or twitching which has since resolved.  Secondly an issue with left side of neck and down the arm there is a dull ache and numbness and tingling ike. When lying on her side originally found a cyst in cervical area which was removed but still the other symptoms        HISTORY OF PRESENT ILLNESS  Andre Wilson is a 54 y.o. female who came in for neurological consultation requested by Dr. Watkins.  When the original referral was placed, she was having facial twitching but it resolved on its own after about 3 to 4 months.    She wants to discuss another problem which is neck pain radiating into the left arm along with numbness and tingling sensation.  But 2 years ago she started experiencing this symptom and was found to have a cyst in the cervical cord region that was removed surgically.  It helped with intense part of the pain but it never completely resolved.  Over time she has noticed that her arm will feel as if she has slept on it and will go numb and clumsy.  She feels that the strength in the left arm is not the same as the right.  She did do physical therapy in the past prior to surgery but that did not provide any relief.      Past Medical History:   Diagnosis Date    Anxiety April 2023    Arthritis     History of removal of neck cyst     left side;currently taking antibiotics. patient stated cyst burst    Hypertension     Ill-defined condition     history of chemotherapy and radiation    Insomnia 8/5/2020    Malignant tumor of cervix (HCC) 8/5/2020    Obesity 2022     Current Outpatient Medications   Medication Sig    triamcinolone (KENALOG) 0.1 % cream Apply topically 2 times daily.    estradiol (VIVELLE-DOT) 0.0375 MG/24HR Place 1 patch onto the skin Twice a Week    losartan (COZAAR) 50 MG tablet Take 1 tablet by mouth daily    metFORMIN (GLUCOPHAGE-XR) 500 MG extended release tablet Take 1 tablet by mouth daily (with

## 2024-03-15 NOTE — PROGRESS NOTES
carbohydrate foods (bread, pasta, rice, potatoes) in moderation. Pt is eating sweets/desserts none. Pt is using healthy cooking methods. Pt is eating meals prepared outside of the home once a week. Pt is drinking water, decaf coffee with sugar-free creamer and plans to add 1/2 cup grapefruit juice (for HTN). Pt reports no to emotional eating.         Physical Activity/Exercise  We talked about the importance of increasing daily physical activity and beginning to develop an exercise regimen/routine. We talked about exercise as being an important part of long term weight loss after surgery.     Comments:  During class, I discussed with patient the importance of getting into an exercise routine.  Pt is currently none d/t recent illness. Pt has been encouraged to resume exercise/walking once able/tolerated.     Behavior Modification       We talked about how to eat more mindfully. Tips and recommendations for how to make these changes were provided. Pt was encouraged to keep a food journal and record what they were taking in daily.         Overall Assessment: Pt demonstrates appropriate lifestyle changes evidenced by reported changes and reported wt loss. Will continue to assess.     Patient-Set Goals:   1. Nutrition - continue with meal prepping/portion control and protein focused meals and snacks  2. Exercise - resume walking as tolerated  3. Behavior -portion control to 1/2 cup per meal after surgery     Judy San, ADRY  3/15/2024

## 2024-03-19 ENCOUNTER — PATIENT MESSAGE (OUTPATIENT)
Age: 54
End: 2024-03-19

## 2024-03-19 DIAGNOSIS — E66.01 MORBID OBESITY WITH BMI OF 40.0-44.9, ADULT (HCC): ICD-10-CM

## 2024-03-19 DIAGNOSIS — K21.9 CHRONIC GERD: Primary | ICD-10-CM

## 2024-03-19 DIAGNOSIS — K30 DELAYED GASTRIC EMPTYING: ICD-10-CM

## 2024-03-19 NOTE — TELEPHONE ENCOUNTER
From: Andre Wilson  To: Shantel Sullivan  Sent: 3/19/2024 1:38 PM EDT  Subject: Dr Chong appointment     Good afternoon     Dr. Chong doesn’t have any openings until the end of July or early August. Is there someone else you can reference me to?

## 2024-03-20 ENCOUNTER — TELEPHONE (OUTPATIENT)
Age: 54
End: 2024-03-20

## 2024-03-20 NOTE — TELEPHONE ENCOUNTER
LM for patient to call me to schedule endo procedure with Dr Chong, I left my direct phone number for the patient to call back

## 2024-03-21 ENCOUNTER — TELEPHONE (OUTPATIENT)
Age: 54
End: 2024-03-21

## 2024-03-21 ENCOUNTER — PREP FOR PROCEDURE (OUTPATIENT)
Age: 54
End: 2024-03-21

## 2024-03-21 PROBLEM — K21.9 GASTROESOPHAGEAL REFLUX DISEASE: Status: ACTIVE | Noted: 2024-03-21

## 2024-03-21 NOTE — TELEPHONE ENCOUNTER
Patient left me a message to  her endo with Dr Chong in the next available and to call her and leave her a message with date and time.      LM for patient that I scheduled her  EGD.  I let her know that this procedure is done at [facility] . I let her know it is scheduled on  4/22/24 @ 7:30AM with arrival time of 6:30AM.      I informed her with the procedure she is required to have someone come in with her at registration and stay on the property during the duration of the procedure.      No driving for 24 hrs after the procedure and no eating after midnight before the procedure    Bring photo ID and insurance card.        I let her know I put all of this information in a letter that posted to her mychart and will go out in the mail.  To call me if she has any questions.

## 2024-03-21 NOTE — TELEPHONE ENCOUNTER
Attempted to contact patient to reschedule [facility] due to scheduling error. Patient did not answer, left message to return call.

## 2024-03-29 ENCOUNTER — HOSPITAL ENCOUNTER (OUTPATIENT)
Facility: HOSPITAL | Age: 54
End: 2024-03-29
Payer: MEDICAID

## 2024-03-29 DIAGNOSIS — K21.9 CHRONIC GERD: ICD-10-CM

## 2024-03-29 DIAGNOSIS — E66.01 MORBID OBESITY WITH BMI OF 40.0-44.9, ADULT (HCC): ICD-10-CM

## 2024-03-29 DIAGNOSIS — K30 DELAYED GASTRIC EMPTYING: ICD-10-CM

## 2024-03-29 PROCEDURE — 78264 GASTRIC EMPTYING IMG STUDY: CPT

## 2024-03-29 PROCEDURE — 3430000000 HC RX DIAGNOSTIC RADIOPHARMACEUTICAL: Performed by: NURSE PRACTITIONER

## 2024-03-29 PROCEDURE — A9541 TC99M SULFUR COLLOID: HCPCS | Performed by: NURSE PRACTITIONER

## 2024-03-29 RX ORDER — TECHNETIUM TC 99M SULFUR COLLOID 2 MG
0.5 KIT MISCELLANEOUS
Status: COMPLETED | OUTPATIENT
Start: 2024-03-29 | End: 2024-03-29

## 2024-03-29 RX ADMIN — TECHNETIUM TC 99M SULFUR COLLOID 0.33 MILLICURIE: KIT at 08:01

## 2024-04-03 RX ORDER — ESTRADIOL 0.05 MG/D
1 PATCH, EXTENDED RELEASE TRANSDERMAL
Qty: 8 PATCH | Refills: 6 | Status: SHIPPED | OUTPATIENT
Start: 2024-04-04

## 2024-04-04 ENCOUNTER — OFFICE VISIT (OUTPATIENT)
Age: 54
End: 2024-04-04
Payer: MEDICAID

## 2024-04-04 VITALS — SYSTOLIC BLOOD PRESSURE: 130 MMHG | WEIGHT: 259 LBS | BODY MASS INDEX: 43.1 KG/M2 | DIASTOLIC BLOOD PRESSURE: 94 MMHG

## 2024-04-04 DIAGNOSIS — Z79.899 PRESCRIPTION MEDICATION STARTED: Primary | ICD-10-CM

## 2024-04-04 LAB
ANION GAP SERPL CALC-SCNC: 1 MMOL/L (ref 5–15)
BUN SERPL-MCNC: 12 MG/DL (ref 6–20)
BUN/CREAT SERPL: 12 (ref 12–20)
CALCIUM SERPL-MCNC: 10.8 MG/DL (ref 8.5–10.1)
CHLORIDE SERPL-SCNC: 110 MMOL/L (ref 97–108)
CO2 SERPL-SCNC: 26 MMOL/L (ref 21–32)
CREAT SERPL-MCNC: 1.02 MG/DL (ref 0.55–1.02)
GLUCOSE SERPL-MCNC: 99 MG/DL (ref 65–100)
POTASSIUM SERPL-SCNC: 4.7 MMOL/L (ref 3.5–5.1)
SODIUM SERPL-SCNC: 137 MMOL/L (ref 136–145)

## 2024-04-04 PROCEDURE — 99214 OFFICE O/P EST MOD 30 MIN: CPT | Performed by: OBSTETRICS & GYNECOLOGY

## 2024-04-04 NOTE — PROGRESS NOTES
Problem Visit    Andre Wilson is a 54 y.o. perimenopausal female presenting for problem visit. Her main concern today is weight loss. She has been struggling with weight gain for years. She was being evaluated for surgical intervention. She is uncertain about having surgery. Has seen a dietitian and continues to follow with her. She is exercising but not as much as she should.            Ob/Gyn Hx:  LMP - Patient's last menstrual period was 09/13/2023.  Menses - no periods   Contraception - none.  Hx of STI - No    SA - Yes                 Past Medical History:   Diagnosis Date    Anxiety April 2023    Arthritis     History of removal of neck cyst     left side;currently taking antibiotics. patient stated cyst burst    Hypertension     Ill-defined condition     history of chemotherapy and radiation    Insomnia 8/5/2020    Malignant tumor of cervix (HCC) 8/5/2020    Obesity 2022       Past Surgical History:   Procedure Laterality Date    BREAST REDUCTION SURGERY Bilateral 2006    COLONOSCOPY N/A 10/1/2021    COLONOSCOPY performed by Blaze Yusuf MD at [facility] ENDOSCOPY-DUE 2026    COSMETIC SURGERY  2007    Breast reduction    CYST REMOVAL  12/22/2021    back of neck    HYSTERECTOMY (CERVIX STATUS UNKNOWN)      cervix removed due to cancer    HYSTERECTOMY, TOTAL ABDOMINAL (CERVIX REMOVED)  2010    Ovaries intact    KIDNEY REMOVAL Right 2012       Family History   Problem Relation Age of Onset    Cancer Maternal Grandmother         lung    Diabetes Sister     Hypertension Father     Hypertension Mother     Heart Disease Mother     Breast Cancer Paternal Aunt 50    Cancer Paternal Grandmother         breast    Breast Cancer Paternal Grandmother 60       Social History     Socioeconomic History    Marital status: Single     Spouse name: Not on file    Number of children: Not on file    Years of education: Not on file    Highest education level: Not on file   Occupational History    Not on file   Tobacco Use    Smoking

## 2024-04-04 NOTE — PROGRESS NOTES
Andre Wilson is a 54 y.o. female presents for a problem visit.    Patient would like to discuss    Ob/Gyn Hx:  LMP - Patient's last menstrual period was 09/13/2023.  Menses - no periods   Contraception - none.  Hx of STI - No    SA - Yes        1. Have you been to the ER, urgent care clinic, or hospitalized since your last visit?Yes for diverticulities    2. Have you seen or consulted any other health care providers outside of the [facility] since your last visit? No    Patient declines chaperone.    LAMONT CHAMORRO RN

## 2024-04-05 ENCOUNTER — HOSPITAL ENCOUNTER (OUTPATIENT)
Facility: HOSPITAL | Age: 54
End: 2024-04-05
Attending: PSYCHIATRY & NEUROLOGY
Payer: MEDICAID

## 2024-04-05 DIAGNOSIS — Z98.890 H/O CERVICAL SPINE SURGERY: ICD-10-CM

## 2024-04-05 DIAGNOSIS — M47.22 CERVICAL SPONDYLOSIS WITH RADICULOPATHY: ICD-10-CM

## 2024-04-05 PROCEDURE — A9579 GAD-BASE MR CONTRAST NOS,1ML: HCPCS | Performed by: RADIOLOGY

## 2024-04-05 PROCEDURE — 72156 MRI NECK SPINE W/O & W/DYE: CPT

## 2024-04-05 PROCEDURE — 6360000004 HC RX CONTRAST MEDICATION: Performed by: RADIOLOGY

## 2024-04-05 RX ADMIN — GADOTERIDOL 20 ML: 279.3 INJECTION, SOLUTION INTRAVENOUS at 19:54

## 2024-04-08 ASSESSMENT — SLEEP AND FATIGUE QUESTIONNAIRES
HOW LIKELY ARE YOU TO NOD OFF OR FALL ASLEEP WHILE LYING DOWN TO REST IN THE AFTERNOON WHEN CIRCUMSTANCES PERMIT: HIGH CHANCE OF DOZING
DO YOU HAVE DIFFICULTY OPERATING A MOTOR VEHICLE FOR LONG DISTANCES (GREATER THAN 100 MILES) BECAUSE YOU BECOME SLEEPY: NO
HOW LIKELY ARE YOU TO NOD OFF OR FALL ASLEEP WHILE LYING DOWN TO REST IN THE AFTERNOON WHEN CIRCUMSTANCES PERMIT: HIGH CHANCE OF DOZING
DO YOU GENERALLY HAVE DIFFICULTY REMEMBERING THINGS BECAUSE YOU ARE SLEEPY OR TIRED: YES, A LITTLE
HOW LIKELY ARE YOU TO NOD OFF OR FALL ASLEEP WHILE SITTING QUIETLY AFTER LUNCH WITHOUT ALCOHOL: MODERATE CHANCE OF DOZING
DO YOU GET TOO LITTLE SLEEP AT NIGHT: YES
SELECT ANY OF THE FOLLOWING BEHAVIORS OBSERVED WHILE YOU ARE ASLEEP: LOUD SNORING
HOW LIKELY ARE YOU TO NOD OFF OR FALL ASLEEP WHEN YOU ARE A PASSENGER IN A CAR FOR AN HOUR WITHOUT A BREAK: HIGH CHANCE OF DOZING
AVERAGE NUMBER OF SLEEP HOURS: 5
HOW LIKELY ARE YOU TO NOD OFF OR FALL ASLEEP WHILE WATCHING TV: MODERATE CHANCE OF DOZING
FOSQ SCORE: 15.5
HOW LIKELY ARE YOU TO NOD OFF OR FALL ASLEEP WHILE SITTING AND TALKING TO SOMEONE: WOULD NEVER DOZE
ESS TOTAL SCORE: 14
HOW LIKELY ARE YOU TO NOD OFF OR FALL ASLEEP WHILE SITTING AND READING: MODERATE CHANCE OF DOZING
HAS YOUR RELATIONSHIP WITH FAMILY, FRIENDS OR WORK COLLEAGUES BEEN AFFECTED BECAUSE YOU ARE SLEEPY OR TIRED: YES, A LITTLE
HOW LIKELY ARE YOU TO NOD OFF OR FALL ASLEEP WHILE WATCHING TV: MODERATE CHANCE OF DOZING
HOW LIKELY ARE YOU TO NOD OFF OR FALL ASLEEP WHILE SITTING INACTIVE IN A PUBLIC PLACE: SLIGHT CHANCE OF DOZING
SELECT ANY OF THE FOLLOWING BEHAVIORS OBSERVED WHILE YOU ARE ASLEEP: GETTING OUT OF THE BED WHILE STILL ASLEEP
SELECT ANY OF THE FOLLOWING BEHAVIORS OBSERVED WHILE YOU ARE ASLEEP: PAUSES IN BREATHING
HOW LIKELY ARE YOU TO NOD OFF OR FALL ASLEEP WHILE SITTING INACTIVE IN A PUBLIC PLACE: SLIGHT CHANCE OF DOZING
HOW LIKELY ARE YOU TO NOD OFF OR FALL ASLEEP IN A CAR, WHILE STOPPED FOR A FEW MINUTES IN TRAFFIC: SLIGHT CHANCE OF DOZING
HOW LIKELY ARE YOU TO NOD OFF OR FALL ASLEEP IN A CAR, WHILE STOPPED FOR A FEW MINUTES IN TRAFFIC: SLIGHT CHANCE OF DOZING
DO YOU WORK SHIFTS: NO
DO YOU HAVE DIFFICULTY OPERATING A MOTOR VEHICLE FOR SHORT DISTANCES (LESS THAN 100 MILES) BECAUSE YOU BECOME SLEEPY: NO
DO YOU HAVE DIFFICULTY BEING AS ACTIVE AS YOU WANT TO BE IN THE EVENING BECAUSE YOU ARE SLEEPY OR TIRED: YES, LITTLE
ARE YOU BOTHERED BY WAKING UP TOO EARLY AND NOT BEING ABLE TO GET BACK TO SLEEP: YES
DO YOU GET TOO LITTLE SLEEP AT NIGHT: YES
DO YOU HAVE DIFFICULTY BEING AS ACTIVE AS YOU WANT TO BE IN THE MORNING BECAUSE YOU ARE SLEEPY OR TIRED: YES, LITTLE
HOW LIKELY ARE YOU TO NOD OFF OR FALL ASLEEP WHILE SITTING AND READING: MODERATE CHANCE OF DOZING
HOW LONG DO YOU NAP: 45 MINUTES TO 1 HOUR
DO YOU TAKE NAPS: YES
NUMBER OF TIMES YOU WAKE PER NIGHT: 3
DO YOU HAVE DIFFICULTY VISITING YOUR FAMILY OR FRIENDS IN THEIR HOME BECAUSE YOU BECOME SLEEPY OR TIRED: YES, A LITTLE
HAS YOUR MOOD BEEN AFFECTED BECAUSE YOU ARE SLEEPY OR TIRED: YES, LITTLE
HOW LIKELY ARE YOU TO NOD OFF OR FALL ASLEEP WHILE SITTING QUIETLY AFTER LUNCH WITHOUT ALCOHOL: MODERATE CHANCE OF DOZING
HOW LIKELY ARE YOU TO NOD OFF OR FALL ASLEEP WHEN YOU ARE A PASSENGER IN A CAR FOR AN HOUR WITHOUT A BREAK: HIGH CHANCE OF DOZING
DO YOU HAVE PROBLEMS WITH FREQUENT AWAKENINGS AT NIGHT: YES
HOW LIKELY ARE YOU TO NOD OFF OR FALL ASLEEP WHILE SITTING AND TALKING TO SOMEONE: WOULD NEVER DOZE
DO YOU HAVE DIFFICULTY CONCENTRATING ON THE THINGS YOU DO BECAUSE YOU ARE SLEEPY OR TIRED: YES, MODERATE
DO YOU HAVE DIFFICULTY WATCHING A MOVIE OR VIDEO BECAUSE YOU BECOME SLEEPY OR TIRED: YES, A LITTLE
WHAT TIME DO YOU USUALLY WAKE UP: 18:30
ARE YOU BOTHERED BY WAKING UP TOO EARLY AND NOT BEING ABLE TO GET BACK TO SLEEP: YES
AVERAGE NUMBER OF SLEEP HOURS: 5
HOW MANY NAPS DO YOU TAKE PER WEEK: 3

## 2024-04-10 ENCOUNTER — OFFICE VISIT (OUTPATIENT)
Age: 54
End: 2024-04-10

## 2024-04-10 ENCOUNTER — OFFICE VISIT (OUTPATIENT)
Age: 54
End: 2024-04-10
Payer: MEDICAID

## 2024-04-10 VITALS
OXYGEN SATURATION: 99 % | HEIGHT: 65 IN | DIASTOLIC BLOOD PRESSURE: 91 MMHG | HEART RATE: 77 BPM | WEIGHT: 256.3 LBS | BODY MASS INDEX: 42.7 KG/M2 | TEMPERATURE: 98.3 F | SYSTOLIC BLOOD PRESSURE: 141 MMHG

## 2024-04-10 DIAGNOSIS — E66.01 SEVERE OBESITY (BMI >= 40) (HCC): ICD-10-CM

## 2024-04-10 DIAGNOSIS — E66.01 MORBID OBESITY (HCC): Primary | ICD-10-CM

## 2024-04-10 DIAGNOSIS — G47.33 OBSTRUCTIVE SLEEP APNEA (ADULT) (PEDIATRIC): Primary | ICD-10-CM

## 2024-04-10 PROCEDURE — 99204 OFFICE O/P NEW MOD 45 MIN: CPT | Performed by: INTERNAL MEDICINE

## 2024-04-10 RX ORDER — POLYETHYLENE GLYCOL 3350 17 G/17G
POWDER, FOR SOLUTION ORAL
COMMUNITY
Start: 2024-03-15

## 2024-04-10 RX ORDER — ACETAMINOPHEN 325 MG/1
TABLET ORAL
COMMUNITY
Start: 2024-02-18 | End: 2024-04-10

## 2024-04-10 NOTE — PROGRESS NOTES
[address]  [place] [address]  Tel.  [phone]  Fax. [phone] [address]  [place] [address]  Tel.  [phone]  Fax. [phone] [address] [place] [address]  Tel.  [phone]  Fax. [phone]         Subjective:      Andre Wilson is an 54 y.o. female referred for evaluation for a sleep disorder. She complains of snoring, tossing and turning associated with excessive daytime sleepiness.  Symptoms began 10 years ago, gradually worsening since that time. She usually can fall asleep in 30 minutes.  Family or house members note snoring. She denies falling asleep while at work, driving.  Andre Wilson (P) does wake up frequently at night. She (P) is bothered by waking up too early and left unable to get back to sleep. She actually sleeps about (P) 5 hours at night and wakes up about (P) 3 times during the night. She (P) does not work shifts:  .   Andre indicates she (P) does get too little sleep at night. Her bedtime is (P) 2300. She awakens at (P) 1830. She (P) does take naps. She takes (P) 3 naps a week lasting (P) 45 min to an hour. She has the following observed behaviors: (P) Loud snoring, Pauses in breathing, Getting out of the bed while still asleep;  .  Other remarks:          4/10/2024     9:20 AM 4/8/2024    11:15 AM   Sleep Medicine   Sitting and reading  2   Watching TV  2   Sitting, inactive in a public place (e.g. a theatre or a meeting)  1   As a passenger in a car for an hour without a break  3   Lying down to rest in the afternoon when circumstances permit  3   Sitting and talking to someone  0   Sitting quietly after a lunch without alcohol  2   In a car, while stopped for a few minutes in traffic  1   [place] Sleepiness Score  14   Neck circumference (Inches) 15     which reflect moderate daytime drowsiness.    No Known Allergies      Current Outpatient Medications:     GAVILAX 17 GM/SCOOP powder, DISSOLVE 17

## 2024-04-10 NOTE — PROGRESS NOTES
[facility] at [facility]  Supervised Weight Loss     Date:   4/10/2024    Patient's Name: Andre Wilson  : 1970    Insurance:  [facility]                           Session: 3   Surgery: Gastric Bypass                  Surgeon:  Fahad Chong M.D.      Height: 65\"                 Previous Month's Reported Weight:    252      Lbs.                              BMI: 41             Pounds Lost since last month: 0#               Pounds Gained since last month: 0     Starting Weight: 264#                       Previous Month’s Weight: 252#  Overall Pounds Lost: 12#                Overall Pounds Gained: 0     Other Pertinent Information: Today's appointment was completed over the phone. Pt inquiring about starting Contrave per her OBGYN.     Smoking Status:  none  Alcohol Intake: none    I have reviewed with pt the guidelines of the supervised wt loss program.  Pt understands the expectations of some wt loss during the program and that wt gain could delay the process. I have also explained that appointments need to be consecutive and missing an appointment may result in starting over. Pt has received this information in writing.          Changes that patient has made since last month include:  maintaining previously made changes.      Eating Habits and Behaviors  General healthy eating guidelines were also discussed. Pts were instructed that their plate should be made up 1/2 plate coming from non-starchy vegetables, 1/4 coming from lean meat, and 1/4 of their plate coming from carbohydrates, including fruits, starches, or milk. We discussed measuring meals to 1/2 cup total per meal after surgery. Drinking only calorie-free, sugar-free and non-carbonated beverages. We discussed the importance of drinking 64 ounces of fluid per day to prevent dehydration post-operatively.                       Patient's current diet habits include: Pt is eating 3 meals per day. Breakfast is a protein

## 2024-04-10 NOTE — PATIENT INSTRUCTIONS
[address]  [place] [address]  Tel.  [phone]  Fax. [phone] [address]  [place] [address]  Tel.  [phone]  Fax. [phone] [address] [place] [address]  Tel.  [phone]  Fax. [phone]     Sleep Apnea: After Your Visit  Your Care Instructions  Sleep apnea occurs when you frequently stop breathing for 10 seconds or longer during sleep. It can be mild to severe, based on the number of times per hour that you stop breathing or have slowed breathing. Blocked or narrowed airways in your nose, mouth, or throat can cause sleep apnea. Your airway can become blocked when your throat muscles and tongue relax during sleep.  Sleep apnea is common, occurring in 1 out of 20 individuals.  Individuals having any of the following characteristics should be evaluated and treated right away due to high risk and detrimental consequences from untreated sleep apnea:  Obesity  Congestive Heart failure  Atrial Fibrillation  Uncontrolled Hypertension  Type II Diabetes  Night-time Arrhythmias  Stroke  Pulmonary Hypertension  High-risk Driving Populations (pilots, truck drivers, etc.)  Patients Considering Weight-loss Surgery    How do you know you have sleep apnea?  You probably have sleep apnea if you answer 'yes' to 3 or more of the following questions:  S - Have you been told that you Snore?   T - Are you often Tired during the day?  O - Has anyone Observed you stop breathing while sleeping?  P- Do you have (or are being treated for) high blood Pressure?    B - Are you obese (Body Mass Index > 35)?  A - Is your Age 50 years old or older?  N - Is your Neck size greater than 16 inches?  G - Are you male Gender?  A sleep physician can prescribe a breathing device that prevents tissues in the throat from blocking your airway. Or your doctor may recommend using a dental device (oral breathing device) to help keep your airway open. In some cases, surgery may

## 2024-04-11 ENCOUNTER — TELEPHONE (OUTPATIENT)
Age: 54
End: 2024-04-11

## 2024-04-11 ENCOUNTER — PROCEDURE VISIT (OUTPATIENT)
Age: 54
End: 2024-04-11

## 2024-04-11 DIAGNOSIS — G56.02 CARPAL TUNNEL SYNDROME OF LEFT WRIST: Primary | ICD-10-CM

## 2024-04-11 NOTE — PROGRESS NOTES
[facility]  [facility]  [address], [address]  [place] [address]  Phone [phone] Fax [phone]  Test Date:  2024    Patient: Brayan Steele : 1946 Physician: Reuben Lazaro MD   Sex: Male Height: 5' 9\" Ref Phys: Varinder Vasques MD   ID#: [id number] Weight: 240 lbs. Technician: Fortunato Severino     Patient Complaints:      Patient History / Exam:        NCV & EMG Findings:  Evaluation of the left median motor nerve showed prolonged distal onset latency (5.2 ms), reduced amplitude (0.3 mV), and decreased conduction velocity (Elbow-Wrist, 44 m/s).  The right median motor nerve showed prolonged distal onset latency (5.2 ms) and reduced amplitude (1.5 mV).  The right ulnar motor nerve showed decreased conduction velocity (A Elbow-B Elbow, 48 m/s).  The left median sensory and the right median sensory nerves showed prolonged distal peak latency (L4.5, R4.4 ms), reduced amplitude (L7.2, R7.2 µV), and decreased conduction velocity (Wrist-2nd Digit, L31, R32 m/s).  The left ulnar sensory and the right ulnar sensory nerves showed reduced amplitude (L13.5, R12.6 µV).  The left median/ulnar (palm) comparison and the right median/ulnar (palm) comparison nerves showed prolonged distal peak latency (Median Palm, L2.9, R2.8 ms) and abnormal peak latency difference (Median Palm-Ulnar Palm, L1.1, R1.2 ms).  All remaining nerves  were within normal limits.      All F Wave latencies were within normal limits.  All F Wave left vs. right side latency differences were within normal limits.      Needle evaluation of the left abductor pollicis brevis muscle showed increased motor unit amplitude.  The right abductor pollicis brevis muscle showed increased motor unit amplitude, slightly increased polyphasic potentials, and diminished recruitment.  All remaining muscles (as indicated in the following table) showed no evidence of electrical instability.

## 2024-04-11 NOTE — PROGRESS NOTES
[facility]  [facility]  [address] [place] [address], [address]  [place] [id number]  Phone [phone] Fax [phone]  Test Date:  2024    Patient: Andre Wilson : 1970 Physician: Reuben Lazaro MD   Sex: Female Height: 5' 5\" Ref Phys: Reuben Lazaro MD   ID#: [id number] Weight: 256 lbs. Technician: Fortunato Severino     Patient Complaints:      Patient History / Exam:  54-year-old female with history of neck surgery for cyst removal in  who is being evaluated for ongoing pain in the left side of her neck radiating to the shoulder, up to the left elbow.  She also wakes up with numb hand on the left side off and on.      NCV & EMG Findings:  Evaluation of the left median/ulnar (palm) comparison nerve showed abnormal peak latency difference (Median Palm-Ulnar Palm, 0.6 ms).  All remaining nerves  were within normal limits.      All F Wave latencies were within normal limits.      All examined muscles (as indicated in the following table) showed no evidence of electrical instability.      Impression:    Moderate entrapment median mononeuropathy i.e. carpal tunnel syndrome at the left wrist.  No evidence to suggest a cervical radiculopathy on the left side.    Recommendations:  Trial of wrist brace    ___________________________  Reuben Lazaro MD        Nerve Conduction Studies  Anti Sensory Summary Table     Stim Site NR Peak (ms) Norm Peak (ms) P-T Amp (µV) Norm P-T Amp Onset (ms) Site1 Site2 Delta-P (ms) Dist (cm) Cameron (m/s) Norm Cameron (m/s)   Left Median Anti Sensory (2nd Digit)  32.6 °C   Wrist    3.6 <3.6 20.0 >10 3.1 Wrist 2nd Digit 3.6 14.0 39 >39   Left Radial Anti Sensory (Base 1st Digit)  32.3 °C   Wrist    1.8 <3.1 41.7  1.3 Wrist Base 1st Digit 1.8 0.0     Left Ulnar Anti Sensory (5th Digit)  32.6 °C   Wrist    2.8 <3.7 20.4 >15.0 2.1 Wrist 5th Digit 2.8 14.0 50 >38     Motor Summary Table     Stim Site NR Onset (ms) Norm Onset (ms) O-P

## 2024-04-11 NOTE — TELEPHONE ENCOUNTER
Identified patient with two patient identifiers (name and ). Reviewed chart in preparation for encounter and have obtained necessary documentation.     Called and spoke with patient, I have scheduled her for her 3 [facility] visits with Dr. Chong. Patient understood what was discussed and was thankful.

## 2024-04-12 ENCOUNTER — CLINICAL DOCUMENTATION (OUTPATIENT)
Age: 54
End: 2024-04-12

## 2024-04-17 ENCOUNTER — PREP FOR PROCEDURE (OUTPATIENT)
Age: 54
End: 2024-04-17

## 2024-04-17 ENCOUNTER — TELEPHONE (OUTPATIENT)
Age: 54
End: 2024-04-17

## 2024-04-17 PROBLEM — K21.9 ESOPHAGEAL REFLUX: Status: ACTIVE | Noted: 2024-04-17

## 2024-04-17 NOTE — TELEPHONE ENCOUNTER
Returning patients call about RS EGD.    LM for patient letting her know I was cancelling EGD for Monday.  That Dr Chong's May Date is fill that I will flag her posting to call her when I have a June date

## 2024-04-22 ENCOUNTER — ANESTHESIA (OUTPATIENT)
Facility: HOSPITAL | Age: 54
End: 2024-04-22
Payer: MEDICAID

## 2024-04-22 ENCOUNTER — ANESTHESIA EVENT (OUTPATIENT)
Facility: HOSPITAL | Age: 54
End: 2024-04-22
Payer: MEDICAID

## 2024-04-22 ENCOUNTER — HOSPITAL ENCOUNTER (OUTPATIENT)
Facility: HOSPITAL | Age: 54
Setting detail: OUTPATIENT SURGERY
Discharge: HOME | End: 2024-04-22
Attending: SURGERY | Admitting: SURGERY
Payer: MEDICAID

## 2024-04-22 VITALS
HEART RATE: 80 BPM | BODY MASS INDEX: 42.49 KG/M2 | DIASTOLIC BLOOD PRESSURE: 76 MMHG | TEMPERATURE: 98.2 F | HEIGHT: 65 IN | SYSTOLIC BLOOD PRESSURE: 121 MMHG | WEIGHT: 255 LBS | OXYGEN SATURATION: 98 % | RESPIRATION RATE: 25 BRPM

## 2024-04-22 PROCEDURE — 2709999900 HC NON-CHARGEABLE SUPPLY: Performed by: SURGERY

## 2024-04-22 PROCEDURE — 3600007502: Performed by: SURGERY

## 2024-04-22 PROCEDURE — 3700000000 HC ANESTHESIA ATTENDED CARE: Performed by: SURGERY

## 2024-04-22 PROCEDURE — 7100000010 HC PHASE II RECOVERY - FIRST 15 MIN: Performed by: SURGERY

## 2024-04-22 PROCEDURE — 6360000002 HC RX W HCPCS: Performed by: NURSE ANESTHETIST, CERTIFIED REGISTERED

## 2024-04-22 PROCEDURE — 2580000003 HC RX 258: Performed by: SURGERY

## 2024-04-22 PROCEDURE — 88305 TISSUE EXAM BY PATHOLOGIST: CPT

## 2024-04-22 PROCEDURE — 43239 EGD BIOPSY SINGLE/MULTIPLE: CPT | Performed by: SURGERY

## 2024-04-22 PROCEDURE — 2720000010 HC SURG SUPPLY STERILE: Performed by: SURGERY

## 2024-04-22 PROCEDURE — 7100000011 HC PHASE II RECOVERY - ADDTL 15 MIN: Performed by: SURGERY

## 2024-04-22 RX ORDER — SODIUM CHLORIDE 0.9 % (FLUSH) 0.9 %
5-40 SYRINGE (ML) INJECTION EVERY 12 HOURS SCHEDULED
Status: DISCONTINUED | OUTPATIENT
Start: 2024-04-22 | End: 2024-04-22 | Stop reason: HOSPADM

## 2024-04-22 RX ORDER — SODIUM CHLORIDE 9 MG/ML
25 INJECTION, SOLUTION INTRAVENOUS PRN
Status: DISCONTINUED | OUTPATIENT
Start: 2024-04-22 | End: 2024-04-22 | Stop reason: HOSPADM

## 2024-04-22 RX ORDER — SODIUM CHLORIDE 0.9 % (FLUSH) 0.9 %
5-40 SYRINGE (ML) INJECTION PRN
Status: DISCONTINUED | OUTPATIENT
Start: 2024-04-22 | End: 2024-04-22 | Stop reason: HOSPADM

## 2024-04-22 RX ADMIN — PROPOFOL 50 MG: 10 INJECTION, EMULSION INTRAVENOUS at 08:04

## 2024-04-22 RX ADMIN — PROPOFOL 50 MG: 10 INJECTION, EMULSION INTRAVENOUS at 08:00

## 2024-04-22 RX ADMIN — PROPOFOL 150 MG: 10 INJECTION, EMULSION INTRAVENOUS at 07:57

## 2024-04-22 RX ADMIN — SODIUM CHLORIDE: 9 INJECTION, SOLUTION INTRAVENOUS at 07:41

## 2024-04-22 ASSESSMENT — PAIN SCALES - GENERAL: PAINLEVEL_OUTOF10: 0

## 2024-04-22 NOTE — ANESTHESIA PRE PROCEDURE
Department of Anesthesiology  Preprocedure Note       Name:  Andre Wilson   Age:  54 y.o.  :  1970                                          MRN:  [id number]         Date:  2024      Surgeon: Surgeon(s):  Fahad Chong MD    Procedure: Procedure(s):  ESOPHAGOGASTRODUODENOSCOPY    Medications prior to admission:   Prior to Admission medications    Medication Sig Start Date End Date Taking? Authorizing Provider   GAVILAX 17 GM/SCOOP powder DISSOLVE 17 GRAMS INTO WATER AND DRINK BY MOUTH EVERY DAY 3/15/24   ProviderYun MD   Multiple Vitamin (MULTIVITAMIN ADULT PO) Take by mouth    Yun Hdz MD   naltrexone-buPROPion (CONTRAVE) 8-90 MG per extended release tablet Take 2 tablets by mouth 2 times daily Week one: 1 tablet daily. Week two: one tablet in the morning and one in the evening. Week 3: two tablets in AM and one in PM. Week four 2 tablets in AM two tabs in PM. 24   Eula Lares MD   estradiol (VIVELLE) 0.05 MG/24HR Place 1 patch onto the skin Twice a Week 24   Sheela Cummings MD   triamcinolone (KENALOG) 0.1 % cream Apply topically 2 times daily. 24   Seb Watkins MD   losartan (COZAAR) 50 MG tablet Take 1 tablet by mouth daily 23   Seb Watkins MD   metFORMIN (GLUCOPHAGE-XR) 500 MG extended release tablet Take 1 tablet by mouth daily (with breakfast) 23   Seb Watkins MD       Current medications:    Current Facility-Administered Medications   Medication Dose Route Frequency Provider Last Rate Last Admin   • sodium chloride flush 0.9 % injection 5-40 mL  5-40 mL IntraVENous 2 times per day Fahad Chong MD       • sodium chloride flush 0.9 % injection 5-40 mL  5-40 mL IntraVENous PRN Fahad Chong MD       • 0.9 % sodium chloride infusion  25 mL IntraVENous PRN Fahad Chong MD 50 mL/hr at 24 0741 New Bag at 24 0741       Allergies:  No Known Allergies    Problem List:    Patient Active Problem List   Diagnosis

## 2024-04-22 NOTE — OP NOTE
Operative Note      Patient: Andre Wilson  YOB: 1970  MRN: [id number]    Date of Procedure: 4/22/2024    Pre-Op Diagnosis Codes:     * Esophageal reflux [K21.9]    Post-Op Diagnosis: Same       Procedure(s):  ESOPHAGOGASTRODUODENOSCOPY WITH BIOPSY    Surgeon(s):  Fahad Chong MD    Assistant:   * No surgical staff found *    Anesthesia: Monitor Anesthesia Care    Estimated Blood Loss (mL): Minimal    Complications: None    Specimens:   ID Type Source Tests Collected by Time Destination   1 : GE Junction biopsy Tissue GE Junction Biopsy SURGICAL PATHOLOGY Fahad Cohng MD 4/22/2024 0800        Implants:  * No implants in log *      Drains: * No LDAs found *    Findings:  Infection Present At Time Of Surgery (PATOS) (choose all levels that have infection present):  No infection present  Other Findings: minimal esophagitis Grade A at most, biopsy taken, mile gastritis, normal esophagus and duodenum      Detailed Description of Procedure:   The endoscope was passed down the mouth into the esophagus and then down into the stomach.  The stomach had a normal appearance with possibly some mild distal gastritis.  No ulcers or mass noted.  The pylorus and duodenum were normal.  The scope was retroflexed and no hiatal hernia was seen. The scope was withdrawn into the esophagus and noted to GE junction had some mild irregularity to it a mild esophagitis grade A at most.  Biopsy was taken at the GE junction.  I then went back into the stomach and decompressed the stomach and withdrew the scope completely.  Dr Chong was present during the entire endoscopy.    Electronically signed by Fahad Chong MD on 4/22/2024 at 8:17 AM

## 2024-04-22 NOTE — PROGRESS NOTES

## 2024-04-22 NOTE — ANESTHESIA POSTPROCEDURE EVALUATION
Department of Anesthesiology  Postprocedure Note    Patient: Andre Wilson  MRN: [id number]  YOB: 1970  Date of evaluation: 4/22/2024    Procedure Summary       Date: 04/22/24 Room / Location: [facility] 06 / [facility]    Anesthesia Start: 0755 Anesthesia Stop: 0815    Procedure: ESOPHAGOGASTRODUODENOSCOPY Diagnosis:       Esophageal reflux      (Esophageal reflux [K21.9])    Surgeons: Fahad Chong MD Responsible Provider: Casey Song MD    Anesthesia Type: MAC ASA Status: 3            Anesthesia Type: MAC    Kelly Phase I: Kelly Score: 10    Kelly Phase II: Kelly Score: 10    Anesthesia Post Evaluation    Nausea & Vomiting: no vomiting  Cardiovascular status: blood pressure returned to baseline  Respiratory status: acceptable    No notable events documented.

## 2024-04-22 NOTE — H&P
the surgery.        Fahad Chong MD    Greater than half of the time: 20 minutes was used in counciling the patient about diagnosis and treatment plan    Ms. Wilson has a reminder for a \"due or due soon\" health maintenance. I have asked that she contact her primary care provider for follow-up on this health maintenance.

## 2024-04-28 DIAGNOSIS — R73.03 PREDIABETES: ICD-10-CM

## 2024-04-29 ENCOUNTER — OFFICE VISIT (OUTPATIENT)
Age: 54
End: 2024-04-29
Payer: MEDICAID

## 2024-04-29 ENCOUNTER — CLINICAL DOCUMENTATION (OUTPATIENT)
Age: 54
End: 2024-04-29

## 2024-04-29 VITALS
WEIGHT: 256 LBS | OXYGEN SATURATION: 94 % | HEART RATE: 69 BPM | HEIGHT: 65 IN | TEMPERATURE: 97.4 F | DIASTOLIC BLOOD PRESSURE: 94 MMHG | BODY MASS INDEX: 42.65 KG/M2 | SYSTOLIC BLOOD PRESSURE: 152 MMHG | RESPIRATION RATE: 20 BRPM

## 2024-04-29 DIAGNOSIS — E66.01 MORBID OBESITY WITH BMI OF 40.0-44.9, ADULT (HCC): Primary | ICD-10-CM

## 2024-04-29 PROCEDURE — 99214 OFFICE O/P EST MOD 30 MIN: CPT | Performed by: SURGERY

## 2024-04-29 RX ORDER — METFORMIN HYDROCHLORIDE 500 MG/1
500 TABLET, EXTENDED RELEASE ORAL
Qty: 90 TABLET | Refills: 1 | Status: SHIPPED | OUTPATIENT
Start: 2024-04-29

## 2024-04-29 ASSESSMENT — ANXIETY QUESTIONNAIRES
7. FEELING AFRAID AS IF SOMETHING AWFUL MIGHT HAPPEN: NOT AT ALL
6. BECOMING EASILY ANNOYED OR IRRITABLE: NOT AT ALL
GAD7 TOTAL SCORE: 0
1. FEELING NERVOUS, ANXIOUS, OR ON EDGE: NOT AT ALL
2. NOT BEING ABLE TO STOP OR CONTROL WORRYING: NOT AT ALL
5. BEING SO RESTLESS THAT IT IS HARD TO SIT STILL: NOT AT ALL
3. WORRYING TOO MUCH ABOUT DIFFERENT THINGS: NOT AT ALL
IF YOU CHECKED OFF ANY PROBLEMS ON THIS QUESTIONNAIRE, HOW DIFFICULT HAVE THESE PROBLEMS MADE IT FOR YOU TO DO YOUR WORK, TAKE CARE OF THINGS AT HOME, OR GET ALONG WITH OTHER PEOPLE: NOT DIFFICULT AT ALL
4. TROUBLE RELAXING: NOT AT ALL

## 2024-04-29 ASSESSMENT — PATIENT HEALTH QUESTIONNAIRE - PHQ9
SUM OF ALL RESPONSES TO PHQ QUESTIONS 1-9: 0
2. FEELING DOWN, DEPRESSED OR HOPELESS: NOT AT ALL
1. LITTLE INTEREST OR PLEASURE IN DOING THINGS: NOT AT ALL
SUM OF ALL RESPONSES TO PHQ QUESTIONS 1-9: 0
SUM OF ALL RESPONSES TO PHQ9 QUESTIONS 1 & 2: 0

## 2024-04-29 NOTE — PROGRESS NOTES
[facility] Visit 1 of 3    History of Present Illness:      Andre Wilson is a 54 y.o. female who has started the process for bariatric surgery.  She is most interested in sleeve gastrectomy.  She says she has had some occasional GERD issues and she has gone through an extensive workup for this.  She had an upper GI gastric emptying study and recently had an EGD.  She otherwise has been in good health and has done 3 of her nutrition visits    Past Medical History:   Diagnosis Date    Anxiety April 2023    Arthritis     History of removal of neck cyst     left side;currently taking antibiotics. patient stated cyst burst    Hypertension     Insomnia 8/5/2020    Malignant tumor of cervix (HCC) 8/5/2020    s/p chemo & XRT    Morbid Obesity 2022       Past Surgical History:   Procedure Laterality Date    BREAST REDUCTION SURGERY Bilateral 2006    COLONOSCOPY N/A 10/1/2021    COLONOSCOPY performed by Blaze Yusuf MD at [facility] ENDOSCOPY-DUE 2026    COSMETIC SURGERY  2007    Breast reduction    CYST REMOVAL  12/22/2021    back of neck    HYSTERECTOMY (CERVIX STATUS UNKNOWN)      cervix removed due to cancer    HYSTERECTOMY, TOTAL ABDOMINAL (CERVIX REMOVED)  2010    Ovaries intact    KIDNEY REMOVAL Right 2012    UPPER GASTROINTESTINAL ENDOSCOPY N/A 4/22/2024    ESOPHAGOGASTRODUODENOSCOPY performed by Fahad Chong MD at [facility] END[facility]COPY         Current Outpatient Medications:     Multiple Vitamin (MULTIVITAMIN ADULT PO), Take by mouth, Disp: , Rfl:     naltrexone-buPROPion (CONTRAVE) 8-90 MG per extended release tablet, Take 2 tablets by mouth 2 times daily Week one: 1 tablet daily. Week two: one tablet in the morning and one in the evening. Week 3: two tablets in AM and one in PM. Week four 2 tablets in AM two tabs in PM., Disp: 90 tablet, Rfl: 5    estradiol (VIVELLE) 0.05 MG/24HR, Place 1 patch onto the skin Twice a Week, Disp: 8 patch, Rfl: 6    losartan (COZAAR)

## 2024-04-29 NOTE — PROGRESS NOTES
Identified patient with two patient identifiers (name and ). Reviewed chart in preparation for visit and have obtained necessary documentation.    nAdre Wilson is a 54 y.o. female  Chief Complaint   Patient presents with    Follow-up    Weight Management     Visit 1/3 bariatric insurance requirements      BP (!) 152/94 (Site: Right Upper Arm, Position: Sitting, Cuff Size: Large Adult)   Pulse 69   Temp 97.4 °F (36.3 °C) (Oral)   Resp 20   Ht 1.651 m (5' 5\")   Wt 116.1 kg (256 lb)   LMP 2023   SpO2 94%   BMI 42.60 kg/m²     1. Have you been to the ER, urgent care clinic since your last visit?  Hospitalized since your last visit?no    2. Have you seen or consulted any other health care providers outside of the [facility] since your last visit?  Include any pap smears or colon screening. No    Patient and provider made aware of elevated BP x2. Patient asymptomatic. Patient reminded to monitor BP, continue to take BP medications if prescribed, and follow up with PCP/Cardiologist.  Patient expressed understanding and agreement.

## 2024-05-06 ENCOUNTER — TELEPHONE (OUTPATIENT)
Age: 54
End: 2024-05-06

## 2024-05-08 ENCOUNTER — OFFICE VISIT (OUTPATIENT)
Age: 54
End: 2024-05-08

## 2024-05-08 DIAGNOSIS — Z76.89 ENCOUNTER FOR WEIGHT MANAGEMENT: Primary | ICD-10-CM

## 2024-05-08 DIAGNOSIS — E66.01 MORBID OBESITY (HCC): Primary | ICD-10-CM

## 2024-05-08 NOTE — PROGRESS NOTES
[facility] at [facility]  Supervised Weight Loss     Date:   2024    Patient's Name: Andre Wilson  : 1970    Insurance:  [facility]                           Session:   Surgery: Gastric Bypass                  Surgeon:  Fahad Chong M.D.      Height: 65\"                 Previous EMR Weight:    256      Lbs.                              BMI: 42             Pounds Lost since last month: 0#               Pounds Gained since last month: 4     Starting Weight: 264#                       Previous Month’s Weight: 252#  Overall Pounds Lost: 8#                Overall Pounds Gained: 0     Other Pertinent Information: Today's appointment was completed over the phone.  Pt started Contrave this past month for 3 weeks but discontinued last week d/t side effects.     Smoking Status:  none  Alcohol Intake: none    I have reviewed with pt the guidelines of the supervised wt loss program.  Pt understands the expectations of some wt loss during the program and that wt gain could delay the process. I have also explained that appointments need to be consecutive and missing an appointment may result in starting over. Pt has received this information in writing.          Changes that patient has made since last month include:  maintaining previously made changes.      Eating Habits and Behaviors  A nutrition lesson was presented on label reading with specific guidelines provided for limiting added sugars. This information will help increase healthy food choices, promote weight loss and prevent dumping syndrome after gastric bypass. We also reviewed the general nutrition guidelines for bariatric surgery.                        Patient's current diet habits include: Pt is eating 3 meals per day. Breakfast is a protein shake. Lunch is deli meat, cheese, carrots/celery/crackers. Dinner is burger (no bun) and potatoes. Snack choices include smoothies, yogurt, sugar-free gelatin and pudding. Pt

## 2024-06-04 RX ORDER — LOSARTAN POTASSIUM 50 MG/1
50 TABLET ORAL DAILY
Qty: 90 TABLET | Refills: 1 | Status: SHIPPED | OUTPATIENT
Start: 2024-06-04

## 2024-06-04 NOTE — TELEPHONE ENCOUNTER
PCP: Seb Watkins MD    Last Visit 1/29/2024   Future Appointments   Date Time Provider Department Center   6/4/2024  2:30 PM Cindy Levy RD [facility] BS AMB   6/27/2024  9:00 AM Fahad Chong MD [facility] BS AMB   7/29/2024  9:15 AM Seb Watkins MD [facility] BS AMB       Requested Prescriptions     Pending Prescriptions Disp Refills    losartan (COZAAR) 50 MG tablet [Pharmacy Med Name: LOSARTAN 50MG TABLETS] 90 tablet 1     Sig: TAKE 1 TABLET BY MOUTH DAILY         Other Comments: Last Refill -11/20/2023 next appt with Dr. Watkins on 7/29/2024

## 2024-06-27 ENCOUNTER — OFFICE VISIT (OUTPATIENT)
Age: 54
End: 2024-06-27
Payer: MEDICAID

## 2024-06-27 VITALS
TEMPERATURE: 98 F | DIASTOLIC BLOOD PRESSURE: 74 MMHG | HEART RATE: 82 BPM | WEIGHT: 255 LBS | HEIGHT: 65 IN | BODY MASS INDEX: 42.49 KG/M2 | RESPIRATION RATE: 18 BRPM | OXYGEN SATURATION: 98 % | SYSTOLIC BLOOD PRESSURE: 122 MMHG

## 2024-06-27 DIAGNOSIS — E66.01 MORBID OBESITY WITH BMI OF 40.0-44.9, ADULT (HCC): Primary | ICD-10-CM

## 2024-06-27 PROCEDURE — 99214 OFFICE O/P EST MOD 30 MIN: CPT | Performed by: SURGERY

## 2024-06-27 ASSESSMENT — ANXIETY QUESTIONNAIRES
6. BECOMING EASILY ANNOYED OR IRRITABLE: NOT AT ALL
GAD7 TOTAL SCORE: 0
5. BEING SO RESTLESS THAT IT IS HARD TO SIT STILL: NOT AT ALL
IF YOU CHECKED OFF ANY PROBLEMS ON THIS QUESTIONNAIRE, HOW DIFFICULT HAVE THESE PROBLEMS MADE IT FOR YOU TO DO YOUR WORK, TAKE CARE OF THINGS AT HOME, OR GET ALONG WITH OTHER PEOPLE: NOT DIFFICULT AT ALL
3. WORRYING TOO MUCH ABOUT DIFFERENT THINGS: NOT AT ALL
2. NOT BEING ABLE TO STOP OR CONTROL WORRYING: NOT AT ALL
4. TROUBLE RELAXING: NOT AT ALL
1. FEELING NERVOUS, ANXIOUS, OR ON EDGE: NOT AT ALL
7. FEELING AFRAID AS IF SOMETHING AWFUL MIGHT HAPPEN: NOT AT ALL

## 2024-06-27 ASSESSMENT — PATIENT HEALTH QUESTIONNAIRE - PHQ9
2. FEELING DOWN, DEPRESSED OR HOPELESS: NOT AT ALL
SUM OF ALL RESPONSES TO PHQ QUESTIONS 1-9: 0
SUM OF ALL RESPONSES TO PHQ9 QUESTIONS 1 & 2: 0
SUM OF ALL RESPONSES TO PHQ QUESTIONS 1-9: 0
SUM OF ALL RESPONSES TO PHQ QUESTIONS 1-9: 0
1. LITTLE INTEREST OR PLEASURE IN DOING THINGS: NOT AT ALL
SUM OF ALL RESPONSES TO PHQ QUESTIONS 1-9: 0

## 2024-06-27 NOTE — PROGRESS NOTES
[facility] at [place] Bariatric Surgery Visit 2 of 3    History of Present Illness:      Andre Wilson is a 54 y.o. female who has been undergoing the process for laparoscopic sleeve gastrectomy.  She recently had an EGD.  She is otherwise doing well with no other new health events.    Past Medical History:   Diagnosis Date    Anxiety April 2023    Arthritis     History of removal of neck cyst     left side;currently taking antibiotics. patient stated cyst burst    Hypertension     Insomnia 8/5/2020    Malignant tumor of cervix (HCC) 8/5/2020    s/p chemo & XRT    Morbid Obesity 2022       Past Surgical History:   Procedure Laterality Date    BREAST REDUCTION SURGERY Bilateral 2006    COLONOSCOPY N/A 10/1/2021    COLONOSCOPY performed by Blaze Yusuf MD at [facility] ENDOSCOPY-DUE 2026    COSMETIC SURGERY  2007    Breast reduction    CYST REMOVAL  12/22/2021    back of neck    HYSTERECTOMY (CERVIX STATUS UNKNOWN)      cervix removed due to cancer    HYSTERECTOMY, TOTAL ABDOMINAL (CERVIX REMOVED)  2010    Ovaries intact    KIDNEY REMOVAL Right 2012    UPPER GASTROINTESTINAL ENDOSCOPY N/A 4/22/2024    ESOPHAGOGASTRODUODENOSCOPY performed by Fahad Chong MD at [facility] ENDOSCOPY         Current Outpatient Medications:     losartan (COZAAR) 50 MG tablet, TAKE 1 TABLET BY MOUTH DAILY, Disp: 90 tablet, Rfl: 1    metFORMIN (GLUCOPHAGE-XR) 500 MG extended release tablet, Take 1 tablet by mouth daily (with breakfast), Disp: 90 tablet, Rfl: 1    Multiple Vitamin (MULTIVITAMIN ADULT PO), Take by mouth, Disp: , Rfl:     estradiol (VIVELLE) 0.05 MG/24HR, Place 1 patch onto the skin Twice a Week, Disp: 8 patch, Rfl: 6    Semaglutide-Weight Management (WEGOVY) 0.25 MG/0.5ML SOAJ SC injection, Inject 0.25 mg into the skin every 7 days for 4 doses, Disp: 2 mL, Rfl: 0    GAVILAX 17 GM/SCOOP powder, DISSOLVE 17 GRAMS INTO WATER AND DRINK BY MOUTH EVERY DAY (Patient not taking: Reported on 4/29/2024), Disp: ,

## 2024-06-27 NOTE — PROGRESS NOTES
Identified patient with two patient identifiers (name and ). Reviewed chart in preparation for visit and have obtained necessary documentation.    Andre Wilson is a 54 y.o. female  Chief Complaint   Patient presents with    Follow-up    Weight Management     Visit 3/3 of bariatric insurance requirements      /74 (Site: Right Upper Arm, Position: Sitting, Cuff Size: Large Adult)   Pulse 82   Temp 98 °F (36.7 °C) (Oral)   Resp 18   Ht 1.651 m (5' 5\")   Wt 115.7 kg (255 lb)   LMP 2023   SpO2 98%   BMI 42.43 kg/m²     1. Have you been to the ER, urgent care clinic since your last visit?  Hospitalized since your last visit?no    2. Have you seen or consulted any other health care providers outside of the [facility] since your last visit?  Include any pap smears or colon screening. no

## 2024-07-11 ENCOUNTER — TELEPHONE (OUTPATIENT)
Age: 54
End: 2024-07-11

## 2024-07-11 NOTE — TELEPHONE ENCOUNTER
Spoke with patient in regards to dietitian visits.  Made patient aware that she will not need to start over and we will submit to her insurance company with the gap in the visits in hopes that it will be accepted by [facility].

## 2024-07-11 NOTE — TELEPHONE ENCOUNTER
Patient called to schedule nutrition appointment. Patient stated she was not contacted in June by dietitian and received a no show. Patient disagrees with having to restart nutrition visits due to this issue. Patient can be reached at [phone]

## 2024-07-19 ENCOUNTER — OFFICE VISIT (OUTPATIENT)
Age: 54
End: 2024-07-19

## 2024-07-19 DIAGNOSIS — E66.01 MORBID OBESITY (HCC): Primary | ICD-10-CM

## 2024-07-19 NOTE — PROGRESS NOTES
[facility]  Supervised Weight Loss     Date:   2024    Patient's Name: Andre Wilson  : 1970    Insurance:  [facility]                           Session:   Surgery: Gastric Bypass                  Surgeon:  Fahad Chong M.D.      Height: 65\"                 Reported Weight:    254      Lbs.                              BMI: 42             Pounds Lost since last month: 2#               Pounds Gained since last month: 0     Starting Weight: 264#                       Previous Month’s Weight: 256#  Overall Pounds Lost: 10#                Overall Pounds Gained: 0     Other Pertinent Information: Today's appointment was completed over the phone. Today's wt was self-reported.     Smoking Status:  none  Alcohol Intake: none    I have reviewed with pt the guidelines of the supervised wt loss program.  Pt understands the expectations of some wt loss during the program and that wt gain could delay the process. I have also explained that appointments need to be consecutive and missing an appointment may result in starting over. Pt has received this information in writing.          Changes that patient has made since last month include:  eating more protein and vegetables, taking a OTC MVI       Eating Habits and Behaviors  A nutrition lesson specific to vitamins was provided. We discussed the various reasons for needing vitamins and different types and doses. General healthy eating guidelines were also discussed. Pts were instructed that their plate should be made up 1/2 plate coming from non-starchy vegetables, 1/4 coming from lean meat, and 1/4 of their plate coming from carbohydrates, including fruits, starches, or milk.  We discussed measuring meals to 1/2 cup total per meal after surgery. Drinking only calorie-free, sugar-free and non-carbonated beverages. We discussed the importance of drinking 64 ounces of fluid per day to prevent dehydration

## 2024-07-25 ENCOUNTER — TELEPHONE (OUTPATIENT)
Age: 54
End: 2024-07-25

## 2024-07-31 ENCOUNTER — OFFICE VISIT (OUTPATIENT)
Age: 54
End: 2024-07-31
Payer: MEDICAID

## 2024-07-31 VITALS
HEART RATE: 88 BPM | DIASTOLIC BLOOD PRESSURE: 86 MMHG | RESPIRATION RATE: 16 BRPM | BODY MASS INDEX: 42.82 KG/M2 | WEIGHT: 257 LBS | OXYGEN SATURATION: 98 % | HEIGHT: 65 IN | TEMPERATURE: 97.5 F | SYSTOLIC BLOOD PRESSURE: 148 MMHG

## 2024-07-31 DIAGNOSIS — K21.9 CHRONIC GERD: ICD-10-CM

## 2024-07-31 DIAGNOSIS — E66.01 MORBID OBESITY WITH BMI OF 40.0-44.9, ADULT (HCC): Primary | ICD-10-CM

## 2024-07-31 PROCEDURE — 99214 OFFICE O/P EST MOD 30 MIN: CPT | Performed by: SURGERY

## 2024-07-31 RX ORDER — ONDANSETRON 2 MG/ML
4 INJECTION INTRAMUSCULAR; INTRAVENOUS ONCE
OUTPATIENT
Start: 2024-07-31 | End: 2024-07-31

## 2024-07-31 RX ORDER — SODIUM CHLORIDE 9 MG/ML
INJECTION, SOLUTION INTRAVENOUS PRN
OUTPATIENT
Start: 2024-07-31

## 2024-07-31 RX ORDER — SODIUM CHLORIDE, SODIUM LACTATE, POTASSIUM CHLORIDE, CALCIUM CHLORIDE 600; 310; 30; 20 MG/100ML; MG/100ML; MG/100ML; MG/100ML
INJECTION, SOLUTION INTRAVENOUS CONTINUOUS
OUTPATIENT
Start: 2024-07-31

## 2024-07-31 RX ORDER — SODIUM CHLORIDE 0.9 % (FLUSH) 0.9 %
5-40 SYRINGE (ML) INJECTION PRN
OUTPATIENT
Start: 2024-07-31

## 2024-07-31 RX ORDER — SCOLOPAMINE TRANSDERMAL SYSTEM 1 MG/1
1 PATCH, EXTENDED RELEASE TRANSDERMAL
OUTPATIENT
Start: 2024-07-31 | End: 2024-08-03

## 2024-07-31 RX ORDER — SODIUM CHLORIDE 0.9 % (FLUSH) 0.9 %
5-40 SYRINGE (ML) INJECTION EVERY 12 HOURS SCHEDULED
OUTPATIENT
Start: 2024-07-31

## 2024-07-31 RX ORDER — ACETAMINOPHEN 160 MG/5ML
1000 LIQUID ORAL ONCE
OUTPATIENT
Start: 2024-07-31 | End: 2024-07-31

## 2024-07-31 RX ORDER — ENOXAPARIN SODIUM 100 MG/ML
40 INJECTION SUBCUTANEOUS ONCE
OUTPATIENT
Start: 2024-07-31 | End: 2024-07-31

## 2024-07-31 RX ORDER — GABAPENTIN 250 MG/5ML
300 SOLUTION ORAL ONCE
OUTPATIENT
Start: 2024-07-31 | End: 2024-07-31

## 2024-07-31 ASSESSMENT — PATIENT HEALTH QUESTIONNAIRE - PHQ9
SUM OF ALL RESPONSES TO PHQ QUESTIONS 1-9: 0
SUM OF ALL RESPONSES TO PHQ9 QUESTIONS 1 & 2: 0
SUM OF ALL RESPONSES TO PHQ QUESTIONS 1-9: 0
1. LITTLE INTEREST OR PLEASURE IN DOING THINGS: NOT AT ALL
SUM OF ALL RESPONSES TO PHQ QUESTIONS 1-9: 0
SUM OF ALL RESPONSES TO PHQ QUESTIONS 1-9: 0
2. FEELING DOWN, DEPRESSED OR HOPELESS: NOT AT ALL

## 2024-07-31 NOTE — PROGRESS NOTES
[facility] [place] Bariatric Surgery Visit 3 of 3    History of Present Illness:      Andre Wilson is a 54 y.o. female who has been undergoing the process for laparoscopic sleeve gastrectomy.  She has completed most of her preop requirements    Past Medical History:   Diagnosis Date    Anxiety April 2023    Arthritis     History of removal of neck cyst     left side;currently taking antibiotics. patient stated cyst burst    Hypertension     Insomnia 8/5/2020    Malignant tumor of cervix (HCC) 8/5/2020    s/p chemo & XRT    Morbid Obesity 2022       Past Surgical History:   Procedure Laterality Date    BREAST REDUCTION SURGERY Bilateral 2006    COLONOSCOPY N/A 10/1/2021    COLONOSCOPY performed by Blaze Yusuf MD at [facility] ENDOSCOPY-DUE 2026    COSMETIC SURGERY  2007    Breast reduction    CYST REMOVAL  12/22/2021    back of neck    HYSTERECTOMY (CERVIX STATUS UNKNOWN)      cervix removed due to cancer    HYSTERECTOMY, TOTAL ABDOMINAL (CERVIX REMOVED)  2010    Ovaries intact    KIDNEY REMOVAL Right 2012    UPPER GASTROINTESTINAL ENDOSCOPY N/A 4/22/2024    ESOPHAGOGASTRODUODENOSCOPY performed by Fahad Chong MD at [facility] ENDOSCOPY         Current Outpatient Medications:     losartan (COZAAR) 50 MG tablet, TAKE 1 TABLET BY MOUTH DAILY, Disp: 90 tablet, Rfl: 1    metFORMIN (GLUCOPHAGE-XR) 500 MG extended release tablet, Take 1 tablet by mouth daily (with breakfast), Disp: 90 tablet, Rfl: 1    Multiple Vitamin (MULTIVITAMIN ADULT PO), Take by mouth, Disp: , Rfl:     estradiol (VIVELLE) 0.05 MG/24HR, Place 1 patch onto the skin Twice a Week, Disp: 8 patch, Rfl: 6    Semaglutide-Weight Management (WEGOVY) 0.25 MG/0.5ML SOAJ SC injection, Inject 0.25 mg into the skin every 7 days for 4 doses, Disp: 2 mL, Rfl: 0    GAVILAX 17 GM/SCOOP powder, DISSOLVE 17 GRAMS INTO WATER AND DRINK BY MOUTH EVERY DAY (Patient not taking: Reported on 4/29/2024), Disp: , Rfl:     naltrexone-buPROPion (CONTRAVE)

## 2024-07-31 NOTE — PROGRESS NOTES
Identified patient with two patient identifiers (name and ). Reviewed chart in preparation for visit and have obtained necessary documentation.    Andre Wilson is a 54 y.o. female  Chief Complaint   Patient presents with    Follow-up    Weight Management     Visit 3/3 bariatric insurance requirements      Ht 1.651 m (5' 5\")   LMP 2023   BMI 42.43 kg/m²     1. Have you been to the ER, urgent care clinic since your last visit?  Hospitalized since your last visit?no    2. Have you seen or consulted any other health care providers outside of the [facility] since your last visit?  Include any pap smears or colon screening. no     Patient and provider made aware of elevated BP x2. Patient asymptomatic. Patient reminded to monitor BP, continue to take BP medications if prescribed, and follow up with PCP/Cardiologist.  Patient expressed understanding and agreement.

## 2024-08-14 ENCOUNTER — OFFICE VISIT (OUTPATIENT)
Age: 54
End: 2024-08-14

## 2024-08-14 ENCOUNTER — TELEPHONE (OUTPATIENT)
Age: 54
End: 2024-08-14

## 2024-08-14 DIAGNOSIS — E66.01 MORBID OBESITY (HCC): Primary | ICD-10-CM

## 2024-08-14 NOTE — TELEPHONE ENCOUNTER
Identified patient with two patient identifiers (name and ). Reviewed chart in preparation for encounter and have obtained necessary documentation.    Called and spoke with patient regarding [facility] insurance requirements. Informed patient she needs her psych eval and PCP support form and then we can schedule her for her final review once all requirements have been received. Informed patient I would fax over her PCP support form to PCP verified on file. Patient understood what was discussed and thankful for the follow up call.

## 2024-08-14 NOTE — PROGRESS NOTES
[facility]   Supervised Weight Loss     Date:   2024    Patient's Name: Andre Wilson  : 1970    Insurance:  [facility]                           Session:   Surgery: Gastric Bypass                  Surgeon:  Fahad Chong M.D.      Height: 65\"                 Reported Weight:    251      Lbs.                              BMI: 41             Pounds Lost since last month: 3#               Pounds Gained since last month: 0     Starting Weight: 264#                       Previous Month’s Weight: 254#  Overall Pounds Lost: 13#                Overall Pounds Gained: 0     Other Pertinent Information: Today's appointment was completed over the phone. Today's wt was self-reported.    Smoking Status:  none  Alcohol Intake: 1 drink per month    I have reviewed with pt the guidelines of the supervised wt loss program.  Pt understands the expectations of some wt loss during the program and that wt gain could delay the process. I have also explained that appointments need to be consecutive and missing an appointment may result in starting over. Pt has received this information in writing.          Changes that patient has made since last month include:  more walking, monitoring food choices more closely.      Eating Habits and Behaviors  General healthy eating guidelines were also discussed. Pts were instructed that their plate should be made up 1/2 plate coming from non-starchy vegetables, 1/4 coming from lean meat, and 1/4 of their plate coming from carbohydrates, including fruits, starches, or milk.  We discussed measuring meals to 1/2 cup total per meal after surgery. Drinking only calorie-free, sugar-free and non-carbonated beverages. We discussed the importance of drinking 64 ounces of fluid per day to prevent dehydration post-operatively.                      Physical Activity/Exercise  We talked about the importance of increasing daily physical activity and

## 2024-08-22 ENCOUNTER — OFFICE VISIT (OUTPATIENT)
Dept: PRIMARY CARE CLINIC | Facility: CLINIC | Age: 54
End: 2024-08-22

## 2024-08-22 ENCOUNTER — TELEPHONE (OUTPATIENT)
Age: 54
End: 2024-08-22

## 2024-08-22 VITALS
OXYGEN SATURATION: 100 % | RESPIRATION RATE: 16 BRPM | DIASTOLIC BLOOD PRESSURE: 84 MMHG | BODY MASS INDEX: 41.75 KG/M2 | WEIGHT: 250.6 LBS | SYSTOLIC BLOOD PRESSURE: 134 MMHG | HEART RATE: 76 BPM | HEIGHT: 65 IN | TEMPERATURE: 97.7 F

## 2024-08-22 DIAGNOSIS — R73.03 PREDIABETES: ICD-10-CM

## 2024-08-22 DIAGNOSIS — E66.01 CLASS 3 SEVERE OBESITY DUE TO EXCESS CALORIES WITHOUT SERIOUS COMORBIDITY WITH BODY MASS INDEX (BMI) OF 40.0 TO 44.9 IN ADULT (HCC): ICD-10-CM

## 2024-08-22 DIAGNOSIS — R35.0 FREQUENT URINATION: ICD-10-CM

## 2024-08-22 DIAGNOSIS — Z23 NEED FOR VACCINATION: ICD-10-CM

## 2024-08-22 DIAGNOSIS — I10 PRIMARY HYPERTENSION: ICD-10-CM

## 2024-08-22 DIAGNOSIS — K57.92 DIVERTICULITIS: ICD-10-CM

## 2024-08-22 DIAGNOSIS — K57.30 DIVERTICULOSIS OF COLON: ICD-10-CM

## 2024-08-22 DIAGNOSIS — Z00.00 ANNUAL PHYSICAL EXAM: Primary | ICD-10-CM

## 2024-08-22 DIAGNOSIS — E55.9 VITAMIN D DEFICIENCY: ICD-10-CM

## 2024-08-22 RX ORDER — METRONIDAZOLE 500 MG/1
500 TABLET ORAL 3 TIMES DAILY
Qty: 21 TABLET | Refills: 0 | Status: SHIPPED | OUTPATIENT
Start: 2024-08-22 | End: 2024-08-29

## 2024-08-22 RX ORDER — CIPROFLOXACIN 500 MG/1
500 TABLET, FILM COATED ORAL 2 TIMES DAILY
Qty: 14 TABLET | Refills: 0 | Status: SHIPPED | OUTPATIENT
Start: 2024-08-22 | End: 2024-08-29

## 2024-08-22 SDOH — ECONOMIC STABILITY: FOOD INSECURITY: WITHIN THE PAST 12 MONTHS, THE FOOD YOU BOUGHT JUST DIDN'T LAST AND YOU DIDN'T HAVE MONEY TO GET MORE.: NEVER TRUE

## 2024-08-22 SDOH — ECONOMIC STABILITY: FOOD INSECURITY: WITHIN THE PAST 12 MONTHS, YOU WORRIED THAT YOUR FOOD WOULD RUN OUT BEFORE YOU GOT MONEY TO BUY MORE.: NEVER TRUE

## 2024-08-22 SDOH — ECONOMIC STABILITY: INCOME INSECURITY: HOW HARD IS IT FOR YOU TO PAY FOR THE VERY BASICS LIKE FOOD, HOUSING, MEDICAL CARE, AND HEATING?: NOT HARD AT ALL

## 2024-08-22 ASSESSMENT — PATIENT HEALTH QUESTIONNAIRE - PHQ9
SUM OF ALL RESPONSES TO PHQ QUESTIONS 1-9: 0
2. FEELING DOWN, DEPRESSED OR HOPELESS: NOT AT ALL
SUM OF ALL RESPONSES TO PHQ QUESTIONS 1-9: 0
SUM OF ALL RESPONSES TO PHQ QUESTIONS 1-9: 0
1. LITTLE INTEREST OR PLEASURE IN DOING THINGS: NOT AT ALL
SUM OF ALL RESPONSES TO PHQ QUESTIONS 1-9: 0
SUM OF ALL RESPONSES TO PHQ9 QUESTIONS 1 & 2: 0

## 2024-08-22 NOTE — PROGRESS NOTES
Health Decision Maker has been checked with the patient          Chief Complaint   Patient presents with    Annual Exam       \"Have you been to the ER, urgent care clinic since your last visit?  Hospitalized since your last visit?\"    Went to ER for diverticulitis    “Have you seen or consulted any other health care providers outside of [facility] since your last visit?”    NO          “Have you had a pap smear?”    YES - Where: pt went in 2018 which is on file.    Date of last Cervical Cancer screen (HPV or PAP): 2/9/2018       Vitals:    08/22/24 0849   BP: 134/84   Pulse: 76   Resp: 16   Temp: 97.7 °F (36.5 °C)   SpO2: 100%      Pt declined water and given tdap in left deltoid.      Click Here for Release of Records Request  
Hib vaccine  Aged Out    Polio vaccine  Aged Out    Meningococcal (ACWY) vaccine  Aged Out    Pneumococcal 0-64 years Vaccine  Aged Out     Immunization History   Administered Date(s) Administered    COVID-19, PFIZER PURPLE top, DILUTE for use, (age 12 y+), 30mcg/0.3mL 03/17/2021, 04/14/2021, 11/26/2021    TDaP, ADACEL (age 10y-64y), BOOSTRIX (age 10y+), IM, 0.5mL 08/22/2024     Patient's last menstrual period was 09/13/2023.        Allergies and Intolerances:   No Known Allergies    Family History:   Family History   Problem Relation Age of Onset    Cancer Maternal Grandmother         lung    Diabetes Sister     Hypertension Father     Hypertension Mother     Heart Disease Mother     Breast Cancer Paternal Aunt 50    Cancer Paternal Grandmother         breast    Breast Cancer Paternal Grandmother 60       Social History:   She  reports that she has never smoked. She has never used smokeless tobacco.  She  reports that she does not currently use alcohol.      Review of Systems:   General: negative for - chills, fatigue, fever, weight change  Psych: negative for - anxiety, depression, irritability or mood swings  ENT: negative for - headaches, hearing change, nasal congestion, oral lesions, sneezing or sore throat  Heme/ Lymph: negative for - bleeding problems, bruising, pallor or swollen lymph nodes  Endo: negative for - hot flashes, polydipsia/polyuria or temperature intolerance  Resp: negative for - cough, shortness of breath or wheezing  CV: negative for - chest pain, edema or palpitations  GI: negative for - abdominal pain, change in bowel habits, constipation, diarrhea or nausea/vomiting  : negative for - dysuria, hematuria, incontinence, pelvic pain or vulvar/vaginal symptoms  MSK: negative for - joint pain, joint swelling or muscle pain  Neuro: negative for - confusion, headaches, seizures or weakness  Derm: negative for - dry skin, hair changes, rash or skin lesion changes      Physical:   Vitals:

## 2024-08-22 NOTE — TELEPHONE ENCOUNTER
Patients PCP form has been filled out and returned vis fax. Patients document has been scanned into media manager.

## 2024-08-29 ENCOUNTER — OFFICE VISIT (OUTPATIENT)
Age: 54
End: 2024-08-29
Payer: MEDICAID

## 2024-08-29 VITALS
DIASTOLIC BLOOD PRESSURE: 74 MMHG | BODY MASS INDEX: 41.42 KG/M2 | RESPIRATION RATE: 14 BRPM | TEMPERATURE: 98.5 F | HEART RATE: 81 BPM | SYSTOLIC BLOOD PRESSURE: 133 MMHG | HEIGHT: 65 IN | OXYGEN SATURATION: 99 % | WEIGHT: 248.6 LBS

## 2024-08-29 DIAGNOSIS — E66.01 MORBID OBESITY WITH BMI OF 40.0-44.9, ADULT (HCC): Primary | ICD-10-CM

## 2024-08-29 PROCEDURE — 99214 OFFICE O/P EST MOD 30 MIN: CPT | Performed by: SURGERY

## 2024-08-29 ASSESSMENT — PATIENT HEALTH QUESTIONNAIRE - PHQ9
SUM OF ALL RESPONSES TO PHQ QUESTIONS 1-9: 0
SUM OF ALL RESPONSES TO PHQ QUESTIONS 1-9: 0
2. FEELING DOWN, DEPRESSED OR HOPELESS: NOT AT ALL
SUM OF ALL RESPONSES TO PHQ9 QUESTIONS 1 & 2: 0
SUM OF ALL RESPONSES TO PHQ QUESTIONS 1-9: 0
SUM OF ALL RESPONSES TO PHQ QUESTIONS 1-9: 0
1. LITTLE INTEREST OR PLEASURE IN DOING THINGS: NOT AT ALL

## 2024-08-29 NOTE — PROGRESS NOTES
Identified patient with two patient identifiers (name and ). Reviewed chart in preparation for visit and have obtained necessary documentation.    Andre Wilson is a 54 y.o. female  Chief Complaint   Patient presents with    Weight Management     Final review     /74 (Site: Left Upper Arm, Position: Sitting, Cuff Size: Large Adult)   Pulse 81   Temp 98.5 °F (36.9 °C) (Oral)   Resp 14   Ht 1.651 m (5' 5\")   Wt 112.8 kg (248 lb 9.6 oz)   LMP 2023   SpO2 99%   BMI 41.37 kg/m²     1. Have you been to the ER, urgent care clinic since your last visit?  Hospitalized since your last visit?no    2. Have you seen or consulted any other health care providers outside of the [facility] since your last visit?  Include any pap smears or colon screening. no

## 2024-08-29 NOTE — PROGRESS NOTES
[facility] [place] Surgery History and Physical    History of Present Illness:      Andre Wilson is a 54 y.o. female who has been undergoing the process for laparoscopic sleeve gastrectomy. She has completed her preop requirements     Past Medical History:   Diagnosis Date    Anxiety April 2023    Arthritis     History of removal of neck cyst     left side;currently taking antibiotics. patient stated cyst burst    Hypertension     Insomnia 8/5/2020    Malignant tumor of cervix (HCC) 8/5/2020    s/p chemo & XRT    Morbid Obesity 2022       Past Surgical History:   Procedure Laterality Date    BREAST REDUCTION SURGERY Bilateral 2006    COLONOSCOPY N/A 10/1/2021    COLONOSCOPY performed by Blaze Yusuf MD at [facility] ENDOSCOPY-DUE 2026    COSMETIC SURGERY  2007    Breast reduction    CYST REMOVAL  12/22/2021    back of neck    HYSTERECTOMY (CERVIX STATUS UNKNOWN)      cervix removed due to cancer    HYSTERECTOMY, TOTAL ABDOMINAL (CERVIX REMOVED)  2010    Ovaries intact    KIDNEY REMOVAL Right 2012    UPPER GASTROINTESTINAL ENDOSCOPY N/A 4/22/2024    ESOPHAGOGASTRODUODENOSCOPY performed by Fahad Chong MD at [facility] ENDOSCOPY         Current Outpatient Medications:     ciprofloxacin (CIPRO) 500 MG tablet, Take 1 tablet by mouth 2 times daily for 7 days, Disp: 14 tablet, Rfl: 0    metroNIDAZOLE (FLAGYL) 500 MG tablet, Take 1 tablet by mouth 3 times daily for 7 days, Disp: 21 tablet, Rfl: 0    losartan (COZAAR) 50 MG tablet, TAKE 1 TABLET BY MOUTH DAILY, Disp: 90 tablet, Rfl: 1    metFORMIN (GLUCOPHAGE-XR) 500 MG extended release tablet, Take 1 tablet by mouth daily (with breakfast), Disp: 90 tablet, Rfl: 1    GAVILAX 17 GM/SCOOP powder, , Disp: , Rfl:     Multiple Vitamin (MULTIVITAMIN ADULT PO), Take by mouth, Disp: , Rfl:     estradiol (VIVELLE) 0.05 MG/24HR, Place 1 patch onto the skin Twice a Week, Disp: 8 patch, Rfl: 6    triamcinolone (KENALOG) 0.1 % cream, Apply topically 2 times  03/05/2024         Imaging  Gastric emptying study-FINDINGS: The calculated gastric emptying half-time is 38 minutes (normal <90  minutes for solids).     Review of the raw images shows unremarkable distribution of  small bowel  radiotracer activity. There is no scintigraphically apparent gastroesophageal  reflux.      IMPRESSION:  Normal gastric Emptying.     Upper GI-FINDINGS:   Examination of the esophagus reveals normal anatomy, without hiatal hernia, or  esophagitis  . Spontaneous gastroesophageal reflux does occur minimally to the  midesophagus.     Examination of the stomach and duodenum reveals delayed gastric emptying, but no  active ulcer formation or other mucosal inflammatory change or mass .     Proximal small bowel loops are normal in position and anatomy.      IMPRESSION:  1. Spontaneous gastroesophageal reflux.  2. Delayed gastric emptying .   3. Otherwise unremarkable air contrast upper GI series.     I have reviewed and agree with all of the pertinent images     EGD result-    Operative Note        Patient: Andre Wilson  YOB: 1970  MRN: [id number]     Date of Procedure: 4/22/2024     Pre-Op Diagnosis Codes:     * Esophageal reflux [K21.9]     Post-Op Diagnosis: Same       Procedure(s):  ESOPHAGOGASTRODUODENOSCOPY WITH BIOPSY     Surgeon(s):  Fahad Chong MD     Assistant:   * No surgical staff found *     Anesthesia: Monitor Anesthesia Care     Estimated Blood Loss (mL): Minimal     Complications: None     Specimens:   ID Type Source Tests Collected by Time Destination   1 : GE Junction biopsy Tissue GE Junction Biopsy SURGICAL PATHOLOGY Fahad Chong MD 4/22/2024 0800           Implants:  * No implants in log *      Drains: * No LDAs found *     Findings:  Infection Present At Time Of Surgery (PATOS) (choose all levels that have infection present):  No infection present  Other Findings: minimal esophagitis Grade A at most, biopsy taken, mile gastritis, normal esophagus and

## 2024-08-30 LAB
25(OH)D3+25(OH)D2 SERPL-MCNC: 32.5 NG/ML (ref 30–100)
ALBUMIN SERPL-MCNC: 4.2 G/DL (ref 3.8–4.9)
ALP SERPL-CCNC: 57 IU/L (ref 44–121)
ALT SERPL-CCNC: 30 IU/L (ref 0–32)
APPEARANCE UR: CLEAR
AST SERPL-CCNC: 34 IU/L (ref 0–40)
BASOPHILS # BLD AUTO: 0 X10E3/UL (ref 0–0.2)
BASOPHILS NFR BLD AUTO: 1 %
BILIRUB SERPL-MCNC: 0.5 MG/DL (ref 0–1.2)
BILIRUB UR QL STRIP: NEGATIVE
BUN SERPL-MCNC: 7 MG/DL (ref 6–24)
BUN/CREAT SERPL: 8 (ref 9–23)
CALCIUM SERPL-MCNC: 10.1 MG/DL (ref 8.7–10.2)
CHLORIDE SERPL-SCNC: 110 MMOL/L (ref 96–106)
CHOLEST SERPL-MCNC: 162 MG/DL (ref 100–199)
CO2 SERPL-SCNC: 23 MMOL/L (ref 20–29)
COLOR UR: YELLOW
CREAT SERPL-MCNC: 0.91 MG/DL (ref 0.57–1)
EGFRCR SERPLBLD CKD-EPI 2021: 76 ML/MIN/1.73
EOSINOPHIL # BLD AUTO: 0.1 X10E3/UL (ref 0–0.4)
EOSINOPHIL NFR BLD AUTO: 2 %
ERYTHROCYTE [DISTWIDTH] IN BLOOD BY AUTOMATED COUNT: 13.8 % (ref 11.7–15.4)
GLOBULIN SER CALC-MCNC: 2.7 G/DL (ref 1.5–4.5)
GLUCOSE SERPL-MCNC: 97 MG/DL (ref 70–99)
GLUCOSE UR QL STRIP: NEGATIVE
HBA1C MFR BLD: 5.9 % (ref 4.8–5.6)
HCT VFR BLD AUTO: 42.5 % (ref 34–46.6)
HDLC SERPL-MCNC: 55 MG/DL
HGB BLD-MCNC: 14.2 G/DL (ref 11.1–15.9)
HGB UR QL STRIP: NEGATIVE
IMM GRANULOCYTES # BLD AUTO: 0 X10E3/UL (ref 0–0.1)
IMM GRANULOCYTES NFR BLD AUTO: 0 %
KETONES UR QL STRIP: NEGATIVE
LDLC SERPL CALC-MCNC: 93 MG/DL (ref 0–99)
LEUKOCYTE ESTERASE UR QL STRIP: NEGATIVE
LYMPHOCYTES # BLD AUTO: 2.2 X10E3/UL (ref 0.7–3.1)
LYMPHOCYTES NFR BLD AUTO: 39 %
MCH RBC QN AUTO: 29.6 PG (ref 26.6–33)
MCHC RBC AUTO-ENTMCNC: 33.4 G/DL (ref 31.5–35.7)
MCV RBC AUTO: 89 FL (ref 79–97)
MICRO URNS: NORMAL
MONOCYTES # BLD AUTO: 0.6 X10E3/UL (ref 0.1–0.9)
MONOCYTES NFR BLD AUTO: 10 %
NEUTROPHILS # BLD AUTO: 2.7 X10E3/UL (ref 1.4–7)
NEUTROPHILS NFR BLD AUTO: 48 %
NITRITE UR QL STRIP: NEGATIVE
PH UR STRIP: 5.5 [PH] (ref 5–7.5)
PLATELET # BLD AUTO: 245 X10E3/UL (ref 150–450)
POTASSIUM SERPL-SCNC: 4.4 MMOL/L (ref 3.5–5.2)
PROT SERPL-MCNC: 6.9 G/DL (ref 6–8.5)
PROT UR QL STRIP: NORMAL
RBC # BLD AUTO: 4.8 X10E6/UL (ref 3.77–5.28)
SODIUM SERPL-SCNC: 143 MMOL/L (ref 134–144)
SP GR UR STRIP: 1.02 (ref 1–1.03)
TRIGL SERPL-MCNC: 72 MG/DL (ref 0–149)
TSH SERPL DL<=0.005 MIU/L-ACNC: 0.39 UIU/ML (ref 0.45–4.5)
UROBILINOGEN UR STRIP-MCNC: 0.2 MG/DL (ref 0.2–1)
VLDLC SERPL CALC-MCNC: 14 MG/DL (ref 5–40)
WBC # BLD AUTO: 5.6 X10E3/UL (ref 3.4–10.8)

## 2024-08-31 LAB — BACTERIA UR CULT: NORMAL

## 2024-09-02 DIAGNOSIS — R79.89 LOW TSH LEVEL: Primary | ICD-10-CM

## 2024-09-11 ENCOUNTER — PREP FOR PROCEDURE (OUTPATIENT)
Age: 54
End: 2024-09-11

## 2024-09-11 DIAGNOSIS — I10 ESSENTIAL HYPERTENSION, MALIGNANT: ICD-10-CM

## 2024-09-11 DIAGNOSIS — E66.01 MORBID OBESITY: ICD-10-CM

## 2024-10-01 ENCOUNTER — HOSPITAL ENCOUNTER (OUTPATIENT)
Age: 54
Discharge: HOME | End: 2024-10-04
Payer: MEDICAID

## 2024-10-01 ENCOUNTER — HOSPITAL ENCOUNTER (OUTPATIENT)
Facility: HOSPITAL | Age: 54
Discharge: HOME | End: 2024-10-04
Payer: MEDICAID

## 2024-10-01 VITALS
HEART RATE: 62 BPM | BODY MASS INDEX: 41.15 KG/M2 | TEMPERATURE: 97.9 F | OXYGEN SATURATION: 100 % | HEIGHT: 65 IN | SYSTOLIC BLOOD PRESSURE: 134 MMHG | DIASTOLIC BLOOD PRESSURE: 84 MMHG | WEIGHT: 247 LBS

## 2024-10-01 LAB
EKG ATRIAL RATE: 65 BPM
EKG DIAGNOSIS: NORMAL
EKG P AXIS: 41 DEGREES
EKG P-R INTERVAL: 176 MS
EKG Q-T INTERVAL: 396 MS
EKG QRS DURATION: 72 MS
EKG QTC CALCULATION (BAZETT): 411 MS
EKG R AXIS: 0 DEGREES
EKG T AXIS: 17 DEGREES
EKG VENTRICULAR RATE: 65 BPM
MAGNESIUM SERPL-MCNC: 2.1 MG/DL (ref 1.6–2.4)
T3FREE SERPL-MCNC: 3.2 PG/ML (ref 2.2–4)
T4 FREE SERPL-MCNC: 1.1 NG/DL (ref 0.8–1.5)

## 2024-10-01 PROCEDURE — 83735 ASSAY OF MAGNESIUM: CPT

## 2024-10-01 PROCEDURE — 84481 FREE ASSAY (FT-3): CPT

## 2024-10-01 PROCEDURE — 71046 X-RAY EXAM CHEST 2 VIEWS: CPT

## 2024-10-01 PROCEDURE — 36415 COLL VENOUS BLD VENIPUNCTURE: CPT

## 2024-10-01 PROCEDURE — 84439 ASSAY OF FREE THYROXINE: CPT

## 2024-10-01 PROCEDURE — 93005 ELECTROCARDIOGRAM TRACING: CPT | Performed by: SURGERY

## 2024-10-01 NOTE — PERIOP NOTE
PAT: 10-1 @ 11;00 CONFIRMATION FAXED TO [facility]  
PATIENT GIVEN WRITTEN INSTRUCTIONS TO GO TO THE IMAGING CENTER/CANCER CENTER [address] TO HAVE CHEST XRAY COMPLETED.   
Clothing  Jewelry  Valuables     When you shower the morning of surgery, please do not apply anything to your skin (lotions, powders, deodorant (if having breast surgery), or makeup, especially mascara). Remove fingernail polish except for clear.  Follow Chlorhexidine body wash instructions provided to you during PAT appointment. The night before and morning of surgery.  Do not shave or trim anywhere 24 hours before surgery  Wear your hair loose or down; no pony-tails, buns, or metal hair clips  Wear loose, comfortable, clean clothes  Wear glasses instead of contacts. Bring a case to keep your glasses safe.  Leave money, valuables, and jewelry, including body piercings, at home  If you were given an Incentive Spirometer, bring it on day of surgery.  If you use inhalers or CPAP machine, bring it with you the day of surgery.     Going Home - or Spending the Night OUTPATIENT SURGERY: You must have a responsible adult drive you home and stay with you 24 hours after surgery. You may not drive for 24 hours after surgery.  ADMITS: If your doctor is keeping you in the hospital after surgery, leave personal belongings/luggage in your car until you have a hospital room number.    Hospital discharge time is 12 noon  Drivers must be here before 12 noon unless you are told differently   Special Instructions Free  parking available from 6 AM until 4:30 PM.       Eating and Drinking Before Bariatric Surgery    Continue your liver shrinking diet until the night before surgery. You may eat your liver shrinking diet at the usual time on the day before your surgery.  Do NOT eat any solid foods after midnight.  You may drink clear liquids only from 12 midnight until 1 hours prior to your arrival time at the hospital on the day of your surgery. Do NOT drink alcohol.  Clear liquids include:  Water  Flavored, sugar-free water  Crystal Light, [place] or sugar free generic  Sugar-free Jose Maria-Aid or generic  Decaffeinated tea or

## 2024-10-02 ENCOUNTER — TELEPHONE (OUTPATIENT)
Age: 54
End: 2024-10-02

## 2024-10-02 ENCOUNTER — TELEMEDICINE (OUTPATIENT)
Age: 54
End: 2024-10-02

## 2024-10-02 VITALS — WEIGHT: 249 LBS | BODY MASS INDEX: 41.48 KG/M2 | HEIGHT: 65 IN

## 2024-10-02 DIAGNOSIS — G89.18 POST-OP PAIN: ICD-10-CM

## 2024-10-02 DIAGNOSIS — E66.01 MORBID OBESITY: Primary | ICD-10-CM

## 2024-10-02 PROCEDURE — 99024 POSTOP FOLLOW-UP VISIT: CPT | Performed by: NURSE PRACTITIONER

## 2024-10-02 RX ORDER — OMEPRAZOLE 40 MG/1
40 CAPSULE, DELAYED RELEASE ORAL
Qty: 90 CAPSULE | Refills: 1 | Status: SHIPPED | OUTPATIENT
Start: 2024-10-02

## 2024-10-02 RX ORDER — GABAPENTIN 100 MG/1
100-200 CAPSULE ORAL 3 TIMES DAILY
Qty: 30 CAPSULE | Refills: 0 | Status: SHIPPED | OUTPATIENT
Start: 2024-10-02 | End: 2024-10-07

## 2024-10-02 RX ORDER — ONDANSETRON 4 MG/1
4 TABLET, FILM COATED ORAL EVERY 8 HOURS PRN
Qty: 30 TABLET | Refills: 0 | Status: SHIPPED | OUTPATIENT
Start: 2024-10-02

## 2024-10-02 RX ORDER — POLYETHYLENE GLYCOL 3350 17 G/17G
17 POWDER, FOR SOLUTION ORAL DAILY
Qty: 1530 G | Refills: 0 | Status: SHIPPED | OUTPATIENT
Start: 2024-10-02 | End: 2024-12-31

## 2024-10-02 ASSESSMENT — PATIENT HEALTH QUESTIONNAIRE - PHQ9
2. FEELING DOWN, DEPRESSED OR HOPELESS: NOT AT ALL
1. LITTLE INTEREST OR PLEASURE IN DOING THINGS: NOT AT ALL
SUM OF ALL RESPONSES TO PHQ QUESTIONS 1-9: 0
SUM OF ALL RESPONSES TO PHQ QUESTIONS 1-9: 0
SUM OF ALL RESPONSES TO PHQ9 QUESTIONS 1 & 2: 0
SUM OF ALL RESPONSES TO PHQ QUESTIONS 1-9: 0
SUM OF ALL RESPONSES TO PHQ QUESTIONS 1-9: 0

## 2024-10-02 ASSESSMENT — ANXIETY QUESTIONNAIRES
IF YOU CHECKED OFF ANY PROBLEMS ON THIS QUESTIONNAIRE, HOW DIFFICULT HAVE THESE PROBLEMS MADE IT FOR YOU TO DO YOUR WORK, TAKE CARE OF THINGS AT HOME, OR GET ALONG WITH OTHER PEOPLE: NOT DIFFICULT AT ALL
2. NOT BEING ABLE TO STOP OR CONTROL WORRYING: NOT AT ALL
5. BEING SO RESTLESS THAT IT IS HARD TO SIT STILL: NOT AT ALL
6. BECOMING EASILY ANNOYED OR IRRITABLE: NOT AT ALL
1. FEELING NERVOUS, ANXIOUS, OR ON EDGE: NOT AT ALL
GAD7 TOTAL SCORE: 0
3. WORRYING TOO MUCH ABOUT DIFFERENT THINGS: NOT AT ALL
7. FEELING AFRAID AS IF SOMETHING AWFUL MIGHT HAPPEN: NOT AT ALL
4. TROUBLE RELAXING: NOT AT ALL

## 2024-10-02 NOTE — PROGRESS NOTES
Identified pt with two pt identifiers (name and ). Reviewed chart in preparation for virtual visit and have obtained necessary documentation.    Andre Wilson is a 54 y.o. female No chief complaint on file.  .    There were no vitals filed for this visit.       1. Have you been to the ER, urgent care clinic since your last visit?  Hospitalized since your last visit?  no     2. Have you seen or consulted any other health care providers outside of the [facility] since your last visit?  Include any pap smears or colon screening.  no

## 2024-10-02 NOTE — PROGRESS NOTES
Ht 1.651 m (5' 5\")   Wt 112.9 kg (249 lb)   LMP 09/13/2023   BMI 41.44 kg/m²       Plan:   1/2. Morbid Obesity- Patient is schedule for Sleeve gastrectomy with Dr.Nathan Chong on 10/22/2024 at [facility]. Counseled patient in regard to post diet restrictions, follow- up office visits, follow- up care. Patient as received educational booklet and vitamin list. Reviewed liver shrinking diet. Start date for Liver shrinking diet 10/8/2024  ERAS protocol reviewed:  Acetaminophen 1000 mg at noon and 8 pm day before surgery and may have clear liquids (no caffeine, no ETOH, no carbonation and calorie free) until 3 hours prior to surgery   Preadmission testing results reviewed with patient   Post operative prescriptions sent to pharmacy on file for AFTER surgery:    Acid suppression Prilosec 40 mg x 30 days, then reassess need    Antiemetic Zofran 4 mg every 8 hours as needed for nausea #30   Antispasmodic Levsin 0.125 mg every 8 hours as needed for cramping  Laxative:  Miralax 1 packet every day   DVT prophylaxis:     early ambulation and mechanical compression, non-smoker for at least 90 days   Post op pain management:  Gabapentin 100-200 mg q 8 hours prn pain x 5 days; acetaminophen 1000 mg po q 8 hours prn; abdominal support / splinting; heating pad prn   Reviewed with patient that lifelong vitamin supplementation is recommended by provider as well as risks of non-compliance.  Will have her stop her metformin 7 days prior to surgery.   Stop estrogen now and can resume 1 month after surgery.       PT verbalized understanding and questions were answered to the best of my knowledge and ability.        Andre Wilson, was evaluated through a synchronous (real-time) audio-video encounter. The patient (or guardian if applicable) is aware that this is a billable service, which includes applicable co-pays. This Virtual Visit was conducted with patient's (and/or legal guardian's) consent. Patient identification was

## 2024-10-10 ENCOUNTER — OFFICE VISIT (OUTPATIENT)
Age: 54
End: 2024-10-10

## 2024-10-10 VITALS
WEIGHT: 248.4 LBS | TEMPERATURE: 98.2 F | HEIGHT: 65 IN | HEART RATE: 87 BPM | SYSTOLIC BLOOD PRESSURE: 113 MMHG | OXYGEN SATURATION: 98 % | RESPIRATION RATE: 18 BRPM | DIASTOLIC BLOOD PRESSURE: 80 MMHG | BODY MASS INDEX: 41.38 KG/M2

## 2024-10-10 DIAGNOSIS — E66.01 MORBID OBESITY: Primary | ICD-10-CM

## 2024-10-10 ASSESSMENT — PATIENT HEALTH QUESTIONNAIRE - PHQ9
SUM OF ALL RESPONSES TO PHQ QUESTIONS 1-9: 0
SUM OF ALL RESPONSES TO PHQ9 QUESTIONS 1 & 2: 0
SUM OF ALL RESPONSES TO PHQ QUESTIONS 1-9: 0
2. FEELING DOWN, DEPRESSED OR HOPELESS: NOT AT ALL
1. LITTLE INTEREST OR PLEASURE IN DOING THINGS: NOT AT ALL

## 2024-10-10 NOTE — PROGRESS NOTES
Identified patient with two patient identifiers (name and ). Reviewed chart in preparation for visit and have obtained necessary documentation.    Andre Wilson is a 54 y.o. female  Chief Complaint   Patient presents with    Weight Management    Follow-up     Sign consents.     /80 (Site: Right Upper Arm, Position: Sitting, Cuff Size: Large Adult)   Pulse 87   Temp 98.2 °F (36.8 °C) (Oral)   Resp 18   Ht 1.651 m (5' 5\")   Wt 112.7 kg (248 lb 6.4 oz)   LMP 2023   SpO2 98%   BMI 41.34 kg/m²     1. Have you been to the ER, urgent care clinic since your last visit?  Hospitalized since your last visit?no    2. Have you seen or consulted any other health care providers outside of the [facility] since your last visit?  Include any pap smears or colon screening. no  
which include bleeding, infection, damage to adjacent organs, venous thromboembolism, need for repeat surgery, death and other unforseen complications.  The patient agreed to proceed with the surgery.        Fahad Chong MD    Greater than half of the time: 30 minutes was used in counciling the patient about diagnosis and treatment plan    Ms. Wilson has a reminder for a \"due or due soon\" health maintenance. I have asked that she contact her primary care provider for follow-up on this health maintenance.

## 2024-10-21 ENCOUNTER — ANESTHESIA EVENT (OUTPATIENT)
Facility: HOSPITAL | Age: 54
DRG: 403 | End: 2024-10-21
Payer: MEDICAID

## 2024-10-22 ENCOUNTER — ANESTHESIA (OUTPATIENT)
Facility: HOSPITAL | Age: 54
DRG: 403 | End: 2024-10-22
Payer: MEDICAID

## 2024-10-22 ENCOUNTER — HOSPITAL ENCOUNTER (INPATIENT)
Facility: HOSPITAL | Age: 54
LOS: 1 days | Discharge: HOME | DRG: 403 | End: 2024-10-23
Attending: SURGERY | Admitting: SURGERY
Payer: MEDICAID

## 2024-10-22 DIAGNOSIS — E66.01 MORBID OBESITY WITH BMI OF 40.0-44.9, ADULT: Primary | ICD-10-CM

## 2024-10-22 LAB
GLUCOSE BLD STRIP.AUTO-MCNC: 121 MG/DL (ref 65–117)
GLUCOSE BLD STRIP.AUTO-MCNC: 137 MG/DL (ref 65–117)
GLUCOSE BLD STRIP.AUTO-MCNC: 92 MG/DL (ref 65–117)
GLUCOSE BLD STRIP.AUTO-MCNC: 97 MG/DL (ref 65–117)
SERVICE CMNT-IMP: ABNORMAL
SERVICE CMNT-IMP: ABNORMAL
SERVICE CMNT-IMP: NORMAL
SERVICE CMNT-IMP: NORMAL

## 2024-10-22 PROCEDURE — 3700000001 HC ADD 15 MINUTES (ANESTHESIA): Performed by: SURGERY

## 2024-10-22 PROCEDURE — 2580000003 HC RX 258: Performed by: SURGERY

## 2024-10-22 PROCEDURE — 2720000010 HC SURG SUPPLY STERILE: Performed by: SURGERY

## 2024-10-22 PROCEDURE — 6370000000 HC RX 637 (ALT 250 FOR IP): Performed by: SURGERY

## 2024-10-22 PROCEDURE — 3700000000 HC ANESTHESIA ATTENDED CARE: Performed by: SURGERY

## 2024-10-22 PROCEDURE — 43775 LAP SLEEVE GASTRECTOMY: CPT | Performed by: PHYSICIAN ASSISTANT

## 2024-10-22 PROCEDURE — 6360000002 HC RX W HCPCS: Performed by: SURGERY

## 2024-10-22 PROCEDURE — 0DJ08ZZ INSPECTION OF UPPER INTESTINAL TRACT, VIA NATURAL OR ARTIFICIAL OPENING ENDOSCOPIC: ICD-10-PCS | Performed by: SURGERY

## 2024-10-22 PROCEDURE — 1100000000 HC RM PRIVATE

## 2024-10-22 PROCEDURE — 3600000004 HC SURGERY LEVEL 4 BASE: Performed by: SURGERY

## 2024-10-22 PROCEDURE — 2500000003 HC RX 250 WO HCPCS: Performed by: SURGERY

## 2024-10-22 PROCEDURE — 3600000014 HC SURGERY LEVEL 4 ADDTL 15MIN: Performed by: SURGERY

## 2024-10-22 PROCEDURE — 2709999900 HC NON-CHARGEABLE SUPPLY: Performed by: SURGERY

## 2024-10-22 PROCEDURE — 43775 LAP SLEEVE GASTRECTOMY: CPT | Performed by: SURGERY

## 2024-10-22 PROCEDURE — 6360000002 HC RX W HCPCS: Performed by: STUDENT IN AN ORGANIZED HEALTH CARE EDUCATION/TRAINING PROGRAM

## 2024-10-22 PROCEDURE — 82962 GLUCOSE BLOOD TEST: CPT

## 2024-10-22 PROCEDURE — 0DB64Z3 EXCISION OF STOMACH, PERCUTANEOUS ENDOSCOPIC APPROACH, VERTICAL: ICD-10-PCS | Performed by: SURGERY

## 2024-10-22 PROCEDURE — 6360000002 HC RX W HCPCS

## 2024-10-22 PROCEDURE — 7100000000 HC PACU RECOVERY - FIRST 15 MIN: Performed by: SURGERY

## 2024-10-22 PROCEDURE — 88307 TISSUE EXAM BY PATHOLOGIST: CPT

## 2024-10-22 PROCEDURE — 7100000001 HC PACU RECOVERY - ADDTL 15 MIN: Performed by: SURGERY

## 2024-10-22 PROCEDURE — 2500000003 HC RX 250 WO HCPCS

## 2024-10-22 RX ORDER — HYDROMORPHONE HYDROCHLORIDE 1 MG/ML
0.5 INJECTION, SOLUTION INTRAMUSCULAR; INTRAVENOUS; SUBCUTANEOUS EVERY 5 MIN PRN
Status: COMPLETED | OUTPATIENT
Start: 2024-10-22 | End: 2024-10-22

## 2024-10-22 RX ORDER — SODIUM CHLORIDE 0.9 % (FLUSH) 0.9 %
5-40 SYRINGE (ML) INJECTION EVERY 12 HOURS SCHEDULED
Status: DISCONTINUED | OUTPATIENT
Start: 2024-10-22 | End: 2024-10-23 | Stop reason: HOSPADM

## 2024-10-22 RX ORDER — HYDRALAZINE HYDROCHLORIDE 20 MG/ML
10 INJECTION INTRAMUSCULAR; INTRAVENOUS ONCE
Status: DISCONTINUED | OUTPATIENT
Start: 2024-10-22 | End: 2024-10-22 | Stop reason: HOSPADM

## 2024-10-22 RX ORDER — SODIUM CHLORIDE, SODIUM LACTATE, POTASSIUM CHLORIDE, CALCIUM CHLORIDE 600; 310; 30; 20 MG/100ML; MG/100ML; MG/100ML; MG/100ML
INJECTION, SOLUTION INTRAVENOUS CONTINUOUS
Status: DISCONTINUED | OUTPATIENT
Start: 2024-10-22 | End: 2024-10-22 | Stop reason: HOSPADM

## 2024-10-22 RX ORDER — PROPOFOL 10 MG/ML
INJECTION, EMULSION INTRAVENOUS
Status: DISCONTINUED | OUTPATIENT
Start: 2024-10-22 | End: 2024-10-22 | Stop reason: SDUPTHER

## 2024-10-22 RX ORDER — SODIUM CHLORIDE 0.9 % (FLUSH) 0.9 %
5-40 SYRINGE (ML) INJECTION PRN
Status: DISCONTINUED | OUTPATIENT
Start: 2024-10-22 | End: 2024-10-22 | Stop reason: HOSPADM

## 2024-10-22 RX ORDER — SODIUM CHLORIDE 9 MG/ML
INJECTION, SOLUTION INTRAVENOUS PRN
Status: DISCONTINUED | OUTPATIENT
Start: 2024-10-22 | End: 2024-10-22 | Stop reason: HOSPADM

## 2024-10-22 RX ORDER — PHENYLEPHRINE HCL IN 0.9% NACL 0.4MG/10ML
SYRINGE (ML) INTRAVENOUS
Status: DISCONTINUED | OUTPATIENT
Start: 2024-10-22 | End: 2024-10-22 | Stop reason: SDUPTHER

## 2024-10-22 RX ORDER — DEXMEDETOMIDINE HYDROCHLORIDE 100 UG/ML
INJECTION, SOLUTION INTRAVENOUS
Status: DISCONTINUED | OUTPATIENT
Start: 2024-10-22 | End: 2024-10-22 | Stop reason: SDUPTHER

## 2024-10-22 RX ORDER — ONDANSETRON 4 MG/1
4 TABLET, ORALLY DISINTEGRATING ORAL EVERY 8 HOURS PRN
Status: DISCONTINUED | OUTPATIENT
Start: 2024-10-22 | End: 2024-10-23 | Stop reason: HOSPADM

## 2024-10-22 RX ORDER — SODIUM CHLORIDE, SODIUM LACTATE, POTASSIUM CHLORIDE, CALCIUM CHLORIDE 600; 310; 30; 20 MG/100ML; MG/100ML; MG/100ML; MG/100ML
INJECTION, SOLUTION INTRAVENOUS CONTINUOUS
Status: DISCONTINUED | OUTPATIENT
Start: 2024-10-22 | End: 2024-10-23 | Stop reason: HOSPADM

## 2024-10-22 RX ORDER — DEXTROSE MONOHYDRATE 100 MG/ML
INJECTION, SOLUTION INTRAVENOUS CONTINUOUS PRN
Status: DISCONTINUED | OUTPATIENT
Start: 2024-10-22 | End: 2024-10-23 | Stop reason: HOSPADM

## 2024-10-22 RX ORDER — SIMETHICONE 80 MG
80 TABLET,CHEWABLE ORAL EVERY 6 HOURS PRN
Status: DISCONTINUED | OUTPATIENT
Start: 2024-10-22 | End: 2024-10-23 | Stop reason: HOSPADM

## 2024-10-22 RX ORDER — NALOXONE HYDROCHLORIDE 0.4 MG/ML
INJECTION, SOLUTION INTRAMUSCULAR; INTRAVENOUS; SUBCUTANEOUS PRN
Status: DISCONTINUED | OUTPATIENT
Start: 2024-10-22 | End: 2024-10-22 | Stop reason: HOSPADM

## 2024-10-22 RX ORDER — ENOXAPARIN SODIUM 100 MG/ML
40 INJECTION SUBCUTANEOUS DAILY
Status: DISCONTINUED | OUTPATIENT
Start: 2024-10-23 | End: 2024-10-23 | Stop reason: HOSPADM

## 2024-10-22 RX ORDER — ACETAMINOPHEN 500 MG
1000 TABLET ORAL ONCE
Status: DISCONTINUED | OUTPATIENT
Start: 2024-10-22 | End: 2024-10-22 | Stop reason: SDUPTHER

## 2024-10-22 RX ORDER — ENOXAPARIN SODIUM 100 MG/ML
40 INJECTION SUBCUTANEOUS ONCE
Status: COMPLETED | OUTPATIENT
Start: 2024-10-22 | End: 2024-10-22

## 2024-10-22 RX ORDER — FENTANYL CITRATE 50 UG/ML
100 INJECTION, SOLUTION INTRAMUSCULAR; INTRAVENOUS
Status: DISCONTINUED | OUTPATIENT
Start: 2024-10-22 | End: 2024-10-22 | Stop reason: HOSPADM

## 2024-10-22 RX ORDER — PROCHLORPERAZINE EDISYLATE 5 MG/ML
5 INJECTION INTRAMUSCULAR; INTRAVENOUS
Status: DISCONTINUED | OUTPATIENT
Start: 2024-10-22 | End: 2024-10-22 | Stop reason: HOSPADM

## 2024-10-22 RX ORDER — DEXAMETHASONE SODIUM PHOSPHATE 4 MG/ML
INJECTION, SOLUTION INTRA-ARTICULAR; INTRALESIONAL; INTRAMUSCULAR; INTRAVENOUS; SOFT TISSUE
Status: DISCONTINUED | OUTPATIENT
Start: 2024-10-22 | End: 2024-10-22 | Stop reason: SDUPTHER

## 2024-10-22 RX ORDER — ONDANSETRON 2 MG/ML
4 INJECTION INTRAMUSCULAR; INTRAVENOUS EVERY 6 HOURS PRN
Status: DISCONTINUED | OUTPATIENT
Start: 2024-10-22 | End: 2024-10-23 | Stop reason: HOSPADM

## 2024-10-22 RX ORDER — HYDROMORPHONE HYDROCHLORIDE 1 MG/ML
1 INJECTION, SOLUTION INTRAMUSCULAR; INTRAVENOUS; SUBCUTANEOUS
Status: DISCONTINUED | OUTPATIENT
Start: 2024-10-22 | End: 2024-10-23 | Stop reason: HOSPADM

## 2024-10-22 RX ORDER — SODIUM CHLORIDE 0.9 % (FLUSH) 0.9 %
5-40 SYRINGE (ML) INJECTION EVERY 12 HOURS SCHEDULED
Status: DISCONTINUED | OUTPATIENT
Start: 2024-10-22 | End: 2024-10-22 | Stop reason: HOSPADM

## 2024-10-22 RX ORDER — DIPHENHYDRAMINE HCL 25 MG
25 CAPSULE ORAL EVERY 6 HOURS PRN
Status: DISCONTINUED | OUTPATIENT
Start: 2024-10-22 | End: 2024-10-23 | Stop reason: HOSPADM

## 2024-10-22 RX ORDER — MIDAZOLAM HYDROCHLORIDE 2 MG/2ML
2 INJECTION, SOLUTION INTRAMUSCULAR; INTRAVENOUS PRN
Status: DISCONTINUED | OUTPATIENT
Start: 2024-10-22 | End: 2024-10-22 | Stop reason: HOSPADM

## 2024-10-22 RX ORDER — ONDANSETRON 2 MG/ML
4 INJECTION INTRAMUSCULAR; INTRAVENOUS ONCE
Status: DISCONTINUED | OUTPATIENT
Start: 2024-10-22 | End: 2024-10-22 | Stop reason: HOSPADM

## 2024-10-22 RX ORDER — SUCCINYLCHOLINE/SOD CL,ISO/PF 200MG/10ML
SYRINGE (ML) INTRAVENOUS
Status: DISCONTINUED | OUTPATIENT
Start: 2024-10-22 | End: 2024-10-22 | Stop reason: SDUPTHER

## 2024-10-22 RX ORDER — FENTANYL CITRATE 50 UG/ML
25 INJECTION, SOLUTION INTRAMUSCULAR; INTRAVENOUS EVERY 5 MIN PRN
Status: DISCONTINUED | OUTPATIENT
Start: 2024-10-22 | End: 2024-10-22 | Stop reason: HOSPADM

## 2024-10-22 RX ORDER — FENTANYL CITRATE 50 UG/ML
INJECTION, SOLUTION INTRAMUSCULAR; INTRAVENOUS
Status: DISCONTINUED | OUTPATIENT
Start: 2024-10-22 | End: 2024-10-22 | Stop reason: SDUPTHER

## 2024-10-22 RX ORDER — SODIUM CHLORIDE 9 MG/ML
INJECTION, SOLUTION INTRAVENOUS PRN
Status: DISCONTINUED | OUTPATIENT
Start: 2024-10-22 | End: 2024-10-23 | Stop reason: HOSPADM

## 2024-10-22 RX ORDER — ACETAMINOPHEN 325 MG/1
650 TABLET ORAL EVERY 6 HOURS
Status: DISCONTINUED | OUTPATIENT
Start: 2024-10-22 | End: 2024-10-23 | Stop reason: HOSPADM

## 2024-10-22 RX ORDER — KETOROLAC TROMETHAMINE 30 MG/ML
15 INJECTION, SOLUTION INTRAMUSCULAR; INTRAVENOUS EVERY 6 HOURS
Status: DISCONTINUED | OUTPATIENT
Start: 2024-10-22 | End: 2024-10-23 | Stop reason: HOSPADM

## 2024-10-22 RX ORDER — MIDAZOLAM HYDROCHLORIDE 1 MG/ML
INJECTION, SOLUTION INTRAMUSCULAR; INTRAVENOUS
Status: DISCONTINUED | OUTPATIENT
Start: 2024-10-22 | End: 2024-10-22 | Stop reason: SDUPTHER

## 2024-10-22 RX ORDER — SCOLOPAMINE TRANSDERMAL SYSTEM 1 MG/1
1 PATCH, EXTENDED RELEASE TRANSDERMAL
Status: DISCONTINUED | OUTPATIENT
Start: 2024-10-25 | End: 2024-10-23 | Stop reason: HOSPADM

## 2024-10-22 RX ORDER — SODIUM CHLORIDE 0.9 % (FLUSH) 0.9 %
5-40 SYRINGE (ML) INJECTION PRN
Status: DISCONTINUED | OUTPATIENT
Start: 2024-10-22 | End: 2024-10-23 | Stop reason: HOSPADM

## 2024-10-22 RX ORDER — ONDANSETRON 2 MG/ML
4 INJECTION INTRAMUSCULAR; INTRAVENOUS
Status: DISCONTINUED | OUTPATIENT
Start: 2024-10-22 | End: 2024-10-22 | Stop reason: HOSPADM

## 2024-10-22 RX ORDER — BUPIVACAINE HYDROCHLORIDE AND EPINEPHRINE 5; 5 MG/ML; UG/ML
INJECTION, SOLUTION EPIDURAL; INTRACAUDAL; PERINEURAL PRN
Status: DISCONTINUED | OUTPATIENT
Start: 2024-10-22 | End: 2024-10-22 | Stop reason: HOSPADM

## 2024-10-22 RX ORDER — GABAPENTIN 300 MG/1
300 CAPSULE ORAL 3 TIMES DAILY
Status: DISCONTINUED | OUTPATIENT
Start: 2024-10-22 | End: 2024-10-23 | Stop reason: HOSPADM

## 2024-10-22 RX ORDER — HYDRALAZINE HYDROCHLORIDE 20 MG/ML
20 INJECTION INTRAMUSCULAR; INTRAVENOUS EVERY 6 HOURS PRN
Status: DISCONTINUED | OUTPATIENT
Start: 2024-10-22 | End: 2024-10-23 | Stop reason: HOSPADM

## 2024-10-22 RX ORDER — GABAPENTIN 250 MG/5ML
300 SOLUTION ORAL ONCE
Status: COMPLETED | OUTPATIENT
Start: 2024-10-22 | End: 2024-10-22

## 2024-10-22 RX ORDER — ACETAMINOPHEN 160 MG/5ML
1000 LIQUID ORAL ONCE
Status: COMPLETED | OUTPATIENT
Start: 2024-10-22 | End: 2024-10-22

## 2024-10-22 RX ORDER — LIDOCAINE HYDROCHLORIDE 10 MG/ML
1 INJECTION, SOLUTION EPIDURAL; INFILTRATION; INTRACAUDAL; PERINEURAL
Status: DISCONTINUED | OUTPATIENT
Start: 2024-10-22 | End: 2024-10-22 | Stop reason: HOSPADM

## 2024-10-22 RX ORDER — SCOLOPAMINE TRANSDERMAL SYSTEM 1 MG/1
1 PATCH, EXTENDED RELEASE TRANSDERMAL
Status: DISCONTINUED | OUTPATIENT
Start: 2024-10-22 | End: 2024-10-23 | Stop reason: HOSPADM

## 2024-10-22 RX ORDER — PANTOPRAZOLE SODIUM 40 MG/1
40 TABLET, DELAYED RELEASE ORAL DAILY
Status: DISCONTINUED | OUTPATIENT
Start: 2024-10-22 | End: 2024-10-23 | Stop reason: HOSPADM

## 2024-10-22 RX ORDER — ONDANSETRON 2 MG/ML
INJECTION INTRAMUSCULAR; INTRAVENOUS
Status: DISCONTINUED | OUTPATIENT
Start: 2024-10-22 | End: 2024-10-22 | Stop reason: SDUPTHER

## 2024-10-22 RX ORDER — ROCURONIUM BROMIDE 10 MG/ML
INJECTION, SOLUTION INTRAVENOUS
Status: DISCONTINUED | OUTPATIENT
Start: 2024-10-22 | End: 2024-10-22 | Stop reason: SDUPTHER

## 2024-10-22 RX ORDER — OXYCODONE HYDROCHLORIDE 5 MG/1
5 TABLET ORAL
Status: DISCONTINUED | OUTPATIENT
Start: 2024-10-22 | End: 2024-10-22 | Stop reason: HOSPADM

## 2024-10-22 RX ORDER — MAGNESIUM SULFATE HEPTAHYDRATE 40 MG/ML
INJECTION, SOLUTION INTRAVENOUS
Status: DISCONTINUED | OUTPATIENT
Start: 2024-10-22 | End: 2024-10-22 | Stop reason: SDUPTHER

## 2024-10-22 RX ORDER — LOSARTAN POTASSIUM 50 MG/1
50 TABLET ORAL DAILY
Status: DISCONTINUED | OUTPATIENT
Start: 2024-10-23 | End: 2024-10-23 | Stop reason: HOSPADM

## 2024-10-22 RX ORDER — INSULIN LISPRO 100 [IU]/ML
0-8 INJECTION, SOLUTION INTRAVENOUS; SUBCUTANEOUS
Status: DISCONTINUED | OUTPATIENT
Start: 2024-10-22 | End: 2024-10-23 | Stop reason: HOSPADM

## 2024-10-22 RX ORDER — DIPHENHYDRAMINE HYDROCHLORIDE 50 MG/ML
25 INJECTION INTRAMUSCULAR; INTRAVENOUS EVERY 6 HOURS PRN
Status: DISCONTINUED | OUTPATIENT
Start: 2024-10-22 | End: 2024-10-23 | Stop reason: HOSPADM

## 2024-10-22 RX ORDER — LIDOCAINE HYDROCHLORIDE 20 MG/ML
INJECTION, SOLUTION EPIDURAL; INFILTRATION; INTRACAUDAL; PERINEURAL
Status: DISCONTINUED | OUTPATIENT
Start: 2024-10-22 | End: 2024-10-22 | Stop reason: SDUPTHER

## 2024-10-22 RX ADMIN — LIDOCAINE HYDROCHLORIDE 100 MG: 20 INJECTION, SOLUTION EPIDURAL; INFILTRATION; INTRACAUDAL; PERINEURAL at 07:34

## 2024-10-22 RX ADMIN — Medication 120 MCG: at 08:09

## 2024-10-22 RX ADMIN — ROCURONIUM BROMIDE 20 MG: 10 INJECTION, SOLUTION INTRAVENOUS at 08:18

## 2024-10-22 RX ADMIN — ACETAMINOPHEN 1000 MG: 650 SOLUTION ORAL at 06:21

## 2024-10-22 RX ADMIN — SODIUM CHLORIDE, POTASSIUM CHLORIDE, SODIUM LACTATE AND CALCIUM CHLORIDE: 600; 310; 30; 20 INJECTION, SOLUTION INTRAVENOUS at 06:37

## 2024-10-22 RX ADMIN — SODIUM CHLORIDE, POTASSIUM CHLORIDE, SODIUM LACTATE AND CALCIUM CHLORIDE: 600; 310; 30; 20 INJECTION, SOLUTION INTRAVENOUS at 12:50

## 2024-10-22 RX ADMIN — FENTANYL CITRATE 25 MCG: 50 INJECTION INTRAMUSCULAR; INTRAVENOUS at 10:10

## 2024-10-22 RX ADMIN — Medication 80 MCG: at 08:07

## 2024-10-22 RX ADMIN — MIDAZOLAM 2 MG: 1 INJECTION INTRAMUSCULAR; INTRAVENOUS at 07:28

## 2024-10-22 RX ADMIN — Medication 160 MG: at 07:35

## 2024-10-22 RX ADMIN — ONDANSETRON 4 MG: 2 INJECTION INTRAMUSCULAR; INTRAVENOUS at 17:08

## 2024-10-22 RX ADMIN — HYDROMORPHONE HYDROCHLORIDE 1 MG: 1 INJECTION, SOLUTION INTRAMUSCULAR; INTRAVENOUS; SUBCUTANEOUS at 12:46

## 2024-10-22 RX ADMIN — HYDROMORPHONE HYDROCHLORIDE 0.5 MG: 1 INJECTION, SOLUTION INTRAMUSCULAR; INTRAVENOUS; SUBCUTANEOUS at 10:05

## 2024-10-22 RX ADMIN — SODIUM CHLORIDE, POTASSIUM CHLORIDE, SODIUM LACTATE AND CALCIUM CHLORIDE: 600; 310; 30; 20 INJECTION, SOLUTION INTRAVENOUS at 21:24

## 2024-10-22 RX ADMIN — ONDANSETRON 4 MG: 2 INJECTION INTRAMUSCULAR; INTRAVENOUS at 08:59

## 2024-10-22 RX ADMIN — Medication 50 MG: at 07:51

## 2024-10-22 RX ADMIN — WATER 2000 MG: 1 INJECTION INTRAMUSCULAR; INTRAVENOUS; SUBCUTANEOUS at 07:48

## 2024-10-22 RX ADMIN — GABAPENTIN 300 MG: 300 CAPSULE ORAL at 16:51

## 2024-10-22 RX ADMIN — ACETAMINOPHEN 650 MG: 325 TABLET ORAL at 18:56

## 2024-10-22 RX ADMIN — SUGAMMADEX 200 MG: 100 INJECTION, SOLUTION INTRAVENOUS at 08:59

## 2024-10-22 RX ADMIN — FENTANYL CITRATE 25 MCG: 50 INJECTION INTRAMUSCULAR; INTRAVENOUS at 10:00

## 2024-10-22 RX ADMIN — FENTANYL CITRATE 50 MCG: 50 INJECTION, SOLUTION INTRAMUSCULAR; INTRAVENOUS at 07:34

## 2024-10-22 RX ADMIN — KETOROLAC TROMETHAMINE 15 MG: 30 INJECTION, SOLUTION INTRAMUSCULAR at 16:51

## 2024-10-22 RX ADMIN — GABAPENTIN 300 MG: 250 SOLUTION ORAL at 06:21

## 2024-10-22 RX ADMIN — PROPOFOL 180 MG: 10 INJECTION, EMULSION INTRAVENOUS at 07:34

## 2024-10-22 RX ADMIN — HYDROMORPHONE HYDROCHLORIDE 0.5 MG: 1 INJECTION, SOLUTION INTRAMUSCULAR; INTRAVENOUS; SUBCUTANEOUS at 10:20

## 2024-10-22 RX ADMIN — FENTANYL CITRATE 50 MCG: 50 INJECTION, SOLUTION INTRAMUSCULAR; INTRAVENOUS at 07:55

## 2024-10-22 RX ADMIN — GABAPENTIN 300 MG: 300 CAPSULE ORAL at 21:30

## 2024-10-22 RX ADMIN — ROCURONIUM BROMIDE 5 MG: 10 INJECTION, SOLUTION INTRAVENOUS at 07:34

## 2024-10-22 RX ADMIN — DEXMEDETOMIDINE 10 MCG: 100 INJECTION, SOLUTION, CONCENTRATE INTRAVENOUS at 08:36

## 2024-10-22 RX ADMIN — DEXAMETHASONE SODIUM PHOSPHATE 8 MG: 4 INJECTION INTRA-ARTICULAR; INTRALESIONAL; INTRAMUSCULAR; INTRAVENOUS; SOFT TISSUE at 07:48

## 2024-10-22 RX ADMIN — ENOXAPARIN SODIUM 40 MG: 100 INJECTION SUBCUTANEOUS at 06:26

## 2024-10-22 RX ADMIN — ROCURONIUM BROMIDE 45 MG: 10 INJECTION, SOLUTION INTRAVENOUS at 07:40

## 2024-10-22 RX ADMIN — PANTOPRAZOLE SODIUM 40 MG: 40 TABLET, DELAYED RELEASE ORAL at 18:56

## 2024-10-22 RX ADMIN — SODIUM CHLORIDE, POTASSIUM CHLORIDE, SODIUM LACTATE AND CALCIUM CHLORIDE: 600; 310; 30; 20 INJECTION, SOLUTION INTRAVENOUS at 09:41

## 2024-10-22 RX ADMIN — MAGNESIUM SULFATE HEPTAHYDRATE 2000 MG: 40 INJECTION, SOLUTION INTRAVENOUS at 08:36

## 2024-10-22 RX ADMIN — FENTANYL CITRATE 25 MCG: 50 INJECTION INTRAMUSCULAR; INTRAVENOUS at 09:51

## 2024-10-22 ASSESSMENT — PAIN SCALES - GENERAL
PAINLEVEL_OUTOF10: 8
PAINLEVEL_OUTOF10: 6
PAINLEVEL_OUTOF10: 8
PAINLEVEL_OUTOF10: 9
PAINLEVEL_OUTOF10: 8
PAINLEVEL_OUTOF10: 5
PAINLEVEL_OUTOF10: 9
PAINLEVEL_OUTOF10: 7
PAINLEVEL_OUTOF10: 7

## 2024-10-22 ASSESSMENT — PAIN DESCRIPTION - DESCRIPTORS
DESCRIPTORS: ACHING;DISCOMFORT
DESCRIPTORS: ACHING;DISCOMFORT
DESCRIPTORS: ACHING
DESCRIPTORS: ACHING
DESCRIPTORS: ACHING;DISCOMFORT
DESCRIPTORS: ACHING;DISCOMFORT
DESCRIPTORS: ACHING
DESCRIPTORS: ACHING;DISCOMFORT

## 2024-10-22 ASSESSMENT — PAIN DESCRIPTION - LOCATION
LOCATION: ABDOMEN

## 2024-10-22 ASSESSMENT — PAIN - FUNCTIONAL ASSESSMENT
PAIN_FUNCTIONAL_ASSESSMENT: 0-10
PAIN_FUNCTIONAL_ASSESSMENT: NONE - DENIES PAIN

## 2024-10-22 ASSESSMENT — PAIN DESCRIPTION - ORIENTATION
ORIENTATION: ANTERIOR
ORIENTATION: ANTERIOR

## 2024-10-22 NOTE — BRIEF OP NOTE
Brief Postoperative Note      Patient: Andre Wilson  YOB: 1970  MRN: [id number]    Date of Procedure: 10/22/2024    Pre-Op Diagnosis Codes:      * Morbid obesity [E66.01]     * Essential hypertension, malignant [I10]    Post-Op Diagnosis: Same       Procedure(s):  GASTRECTOMY SLEEVE LAPAROSCOPIC WITH EGD    Surgeon(s):  Fahad Chong MD    Assistant:  Physician Assistant: Hue Schultz PA  Resident: Kristian Mariano MD    Anesthesia: General    Estimated Blood Loss (mL): Minimal    Complications: None    Specimens:   ID Type Source Tests Collected by Time Destination   1 : Partial Gastrectomy Tissue Stomach SURGICAL PATHOLOGY Fahad Chogn MD 10/22/2024 0850        Implants:  * No implants in log *      Drains: * No LDAs found *    Findings:  Infection Present At Time Of Surgery (PATOS) (choose all levels that have infection present):  No infection present  Other Findings: Normal sleeve gastrectomy over 40Fr Visigi bougie, normal post op EGD, negative leak test    Electronically signed by Fahad Chong MD on 10/22/2024 at 9:01 AM

## 2024-10-22 NOTE — ANESTHESIA PRE PROCEDURE
Department of Anesthesiology  Preprocedure Note       Name:  Andre Wilson   Age:  54 y.o.  :  1970                                          MRN:  [id number]         Date:  10/22/2024      Surgeon: Surgeon(s):  Fahad Chong MD    Procedure: Procedure(s):  GASTRECTOMY SLEEVE LAPAROSCOPIC    Medications prior to admission:   Prior to Admission medications    Medication Sig Start Date End Date Taking? Authorizing Provider   metFORMIN (GLUCOPHAGE-XR) 500 MG extended release tablet Take 1 tablet by mouth daily (with breakfast) 24  Yes Seb Watkins MD   estradiol (VIVELLE) 0.05 MG/24HR Place 1 patch onto the skin Twice a Week 24  Yes Sheela Cummings MD   polyethylene glycol (GLYCOLAX) 17 GM/SCOOP powder Take 17 g by mouth daily 10/2/24 12/31/24  Courtney Burrows APRN - NP   ondansetron (ZOFRAN) 4 MG tablet Take 1 tablet by mouth every 8 hours as needed for Nausea or Vomiting  Patient not taking: Reported on 10/10/2024 10/2/24   Courtney Burrows APRN - NP   gabapentin (NEURONTIN) 100 MG capsule Take 1-2 capsules by mouth 3 times daily for 5 days. Max Daily Amount: 600 mg 10/2/24 10/7/24  Courtney Burrows APRN - NP   omeprazole (PRILOSEC) 40 MG delayed release capsule Take 1 capsule by mouth every morning (before breakfast)  Patient not taking: Reported on 10/10/2024 10/2/24   Courtney Burrows APRN - NP   hyoscyamine (LEVSIN/SL) 0.125 MG sublingual tablet Place 1 tablet under the tongue every 4 hours as needed for Cramping  Patient not taking: Reported on 10/10/2024 10/2/24   Courtney Burrows APRN - NP   psyllium (KONSYL) 28.3 % PACK Take 1 packet by mouth daily  Patient not taking: Reported on 10/10/2024    Yun Hdz MD   losartan (COZAAR) 50 MG tablet TAKE 1 TABLET BY MOUTH DAILY 24   Seb Watkins MD   GAVILAX 17 GM/SCOOP powder  3/15/24   Yun Hdz MD   Multiple Vitamin (MULTIVITAMIN ADULT PO) Take by mouth    Provider, MD Yun   triamcinolone (KENALOG) 0.1 %

## 2024-10-22 NOTE — H&P
ondansetron (ZOFRAN) 4 MG tablet, Take 1 tablet by mouth every 8 hours as needed for Nausea or Vomiting (Patient not taking: Reported on 10/10/2024), Disp: 30 tablet, Rfl: 0    gabapentin (NEURONTIN) 100 MG capsule, Take 1-2 capsules by mouth 3 times daily for 5 days. Max Daily Amount: 600 mg, Disp: 30 capsule, Rfl: 0    omeprazole (PRILOSEC) 40 MG delayed release capsule, Take 1 capsule by mouth every morning (before breakfast) (Patient not taking: Reported on 10/10/2024), Disp: 90 capsule, Rfl: 1    hyoscyamine (LEVSIN/SL) 0.125 MG sublingual tablet, Place 1 tablet under the tongue every 4 hours as needed for Cramping (Patient not taking: Reported on 10/10/2024), Disp: 30 tablet, Rfl: 0    psyllium (KONSYL) 28.3 % PACK, Take 1 packet by mouth daily (Patient not taking: Reported on 10/10/2024), Disp: , Rfl:         Allergies   No Known Allergies        Social History               Socioeconomic History    Marital status: Single       Spouse name: Not on file    Number of children: Not on file    Years of education: Not on file    Highest education level: Not on file   Occupational History    Not on file   Tobacco Use    Smoking status: Never    Smokeless tobacco: Never   Vaping Use    Vaping status: Never Used   Substance and Sexual Activity    Alcohol use: Not Currently    Drug use: Never    Sexual activity: Not Currently       Partners: Male   Other Topics Concern    Not on file   Social History Narrative    Not on file      Social Determinants of Health           Financial Resource Strain: Low Risk  (8/22/2024)     Overall Financial Resource Strain (CARDIA)      Difficulty of Paying Living Expenses: Not hard at all   Food Insecurity: No Food Insecurity (8/22/2024)     Hunger Vital Sign      Worried About Running Out of Food in the Last Year: Never true      Ran Out of Food in the Last Year: Never true   Transportation Needs: Unknown (8/22/2024)     PRAPARE - Transportation      Lack of Transportation (Medical):

## 2024-10-22 NOTE — ANESTHESIA POSTPROCEDURE EVALUATION
Department of Anesthesiology  Postprocedure Note    Post-Anesthesia Evaluation and Assessment    Patient: Andre Wilson MRN: [id number]  SSN: [id number]    YOB: 1970  Age: 54 y.o.  Sex: female      I have evaluated the patient and they are stable and ready for discharge from the PACU.     Cardiovascular Function/Vital Signs  Visit Vitals  BP (!) 154/86   Pulse 74   Temp 98.1 °F (36.7 °C) (Oral)   Resp 20   Ht 1.651 m (5' 5\")   Wt 111.6 kg (245 lb 15.8 oz)   SpO2 99%   BMI 40.93 kg/m²       Patient is status post General anesthesia for Procedure(s):  GASTRECTOMY SLEEVE LAPAROSCOPIC WITH EGD.    Nausea/Vomiting: None    Postoperative hydration reviewed and adequate.    Pain:  Managed    Neurological Status:   At baseline    Mental Status, Level of Consciousness: Alert and  oriented to person, place, and time    Pulmonary Status:   Adequate oxygenation and airway patent    Complications related to anesthesia: None    Post-anesthesia assessment completed. No concerns    Signed By: Yris Leslie DO     October 22, 2024

## 2024-10-22 NOTE — OP NOTE
[address]  [address]                            OPERATIVE REPORT      PATIENT NAME: TAWNY OWENS            : 1970  MED REC NO: [id number]                       ROOM: OR  ACCOUNT NO: [id number]                       ADMIT DATE: 10/22/2024  PROVIDER: Fahad Chong MD    DATE OF SERVICE:  10/22/2024    PREOPERATIVE DIAGNOSES:  Morbid obesity, hypertension, and diabetes.    POSTOPERATIVE DIAGNOSES:  Morbid obesity, hypertension, and diabetes.    PROCEDURES PERFORMED:  Laparoscopic sleeve gastrectomy with esophagogastroduodenoscopy.    SURGEON:  Fahad Chong MD    ASSISTANT:  Physician assistant, NATALIE Morris, and Dr. Kristian Mariano, [facility].    ANESTHESIA:  General.    ESTIMATED BLOOD LOSS:  Minimal.    SPECIMENS REMOVED:  Partial gastrectomy.    INTRAOPERATIVE FINDINGS:  Normal sleeve gastrectomy over a 40-Dutch ViSiGi bougie.  Normal postoperative EGD.  Negative leak test.     COMPLICATIONS:  None.    IMPLANTS:  None.    INDICATIONS:  The patient is a 54-year-old female who has a history of morbid obesity with a preoperative BMI of 40 with bariatric comorbidities including hypertension, obstructive sleep apnea, diabetes, who has been preoperatively evaluated for bariatric surgery.  She has been through the necessary preoperative education and clearance for surgery and is now ready for sleeve gastrectomy.  My assisting PA, Hue Schultz was necessary for the operation due to the complex nature of the bariatric operation and the patient's high BMI.  She was necessary for operation of the camera, retraction, and intraoperative decision making.    DESCRIPTION OF PROCEDURE:  The patient was met in the preoperative holding area.  H and P was updated.  Consent was signed.  All risks and benefits were explained to the patient prior to start of the operation.  She was taken back to the operating room.  She was lying in the supine

## 2024-10-22 NOTE — PROGRESS NOTES
TRANSFER - OUT REPORT:    Verbal report given to JAJA Dallas on Andre Wilson  being transferred to  for routine post-op       Report consisted of patient's Situation, Background, Assessment and   Recommendations(SBAR).     Information from the following report(s) Adult Overview, Surgery Report, Intake/Output, and MAR was reviewed with the receiving nurse.           Lines:   Peripheral IV 10/22/24 Left Hand (Active)   Site Assessment Clean, dry & intact 10/22/24 0919   Line Status Flushed;Infusing 10/22/24 0919   Line Care Cap changed;Connections checked and tightened 10/22/24 0919   Phlebitis Assessment No symptoms 10/22/24 0919   Infiltration Assessment 0 10/22/24 0919   Alcohol Cap Used Yes 10/22/24 0919   Dressing Status Clean, dry & intact 10/22/24 0919   Dressing Type Transparent 10/22/24 0919   Dressing Intervention New 10/22/24 0636        Opportunity for questions and clarification was provided.      Patient transported with:  O2 @ 2lpm and Tech

## 2024-10-23 VITALS
HEIGHT: 65 IN | RESPIRATION RATE: 18 BRPM | DIASTOLIC BLOOD PRESSURE: 54 MMHG | TEMPERATURE: 97.9 F | SYSTOLIC BLOOD PRESSURE: 114 MMHG | BODY MASS INDEX: 40.45 KG/M2 | HEART RATE: 67 BPM | OXYGEN SATURATION: 100 % | WEIGHT: 242.8 LBS

## 2024-10-23 LAB
ANION GAP SERPL CALC-SCNC: 2 MMOL/L (ref 2–12)
BUN SERPL-MCNC: 12 MG/DL (ref 6–20)
BUN/CREAT SERPL: 13 (ref 12–20)
CALCIUM SERPL-MCNC: 10.5 MG/DL (ref 8.5–10.1)
CHLORIDE SERPL-SCNC: 110 MMOL/L (ref 97–108)
CO2 SERPL-SCNC: 27 MMOL/L (ref 21–32)
CREAT SERPL-MCNC: 0.94 MG/DL (ref 0.55–1.02)
ERYTHROCYTE [DISTWIDTH] IN BLOOD BY AUTOMATED COUNT: 13.7 % (ref 11.5–14.5)
GLUCOSE SERPL-MCNC: 90 MG/DL (ref 65–100)
HCT VFR BLD AUTO: 40.9 % (ref 35–47)
HGB BLD-MCNC: 13.9 G/DL (ref 11.5–16)
MCH RBC QN AUTO: 30 PG (ref 26–34)
MCHC RBC AUTO-ENTMCNC: 34 G/DL (ref 30–36.5)
MCV RBC AUTO: 88.1 FL (ref 80–99)
NRBC # BLD: 0 K/UL (ref 0–0.01)
NRBC BLD-RTO: 0 PER 100 WBC
PLATELET # BLD AUTO: 202 K/UL (ref 150–400)
PMV BLD AUTO: 10.9 FL (ref 8.9–12.9)
POTASSIUM SERPL-SCNC: 4.5 MMOL/L (ref 3.5–5.1)
RBC # BLD AUTO: 4.64 M/UL (ref 3.8–5.2)
SODIUM SERPL-SCNC: 139 MMOL/L (ref 136–145)
WBC # BLD AUTO: 16.8 K/UL (ref 3.6–11)

## 2024-10-23 PROCEDURE — 2580000003 HC RX 258: Performed by: SURGERY

## 2024-10-23 PROCEDURE — 6360000002 HC RX W HCPCS: Performed by: SURGERY

## 2024-10-23 PROCEDURE — 6370000000 HC RX 637 (ALT 250 FOR IP): Performed by: SURGERY

## 2024-10-23 PROCEDURE — 85027 COMPLETE CBC AUTOMATED: CPT

## 2024-10-23 PROCEDURE — 80048 BASIC METABOLIC PNL TOTAL CA: CPT

## 2024-10-23 RX ORDER — HYDROMORPHONE HYDROCHLORIDE 2 MG/1
2 TABLET ORAL EVERY 4 HOURS PRN
Status: DISCONTINUED | OUTPATIENT
Start: 2024-10-23 | End: 2024-10-23 | Stop reason: HOSPADM

## 2024-10-23 RX ORDER — HYDROMORPHONE HYDROCHLORIDE 2 MG/1
2 TABLET ORAL EVERY 6 HOURS PRN
Qty: 20 TABLET | Refills: 0 | Status: SHIPPED | OUTPATIENT
Start: 2024-10-23 | End: 2024-10-30

## 2024-10-23 RX ADMIN — KETOROLAC TROMETHAMINE 15 MG: 30 INJECTION, SOLUTION INTRAMUSCULAR at 04:53

## 2024-10-23 RX ADMIN — ENOXAPARIN SODIUM 40 MG: 100 INJECTION SUBCUTANEOUS at 09:27

## 2024-10-23 RX ADMIN — ACETAMINOPHEN 650 MG: 325 TABLET ORAL at 04:53

## 2024-10-23 RX ADMIN — LOSARTAN POTASSIUM 50 MG: 50 TABLET, FILM COATED ORAL at 09:27

## 2024-10-23 RX ADMIN — GABAPENTIN 300 MG: 300 CAPSULE ORAL at 13:55

## 2024-10-23 RX ADMIN — PANTOPRAZOLE SODIUM 40 MG: 40 TABLET, DELAYED RELEASE ORAL at 09:27

## 2024-10-23 RX ADMIN — SODIUM CHLORIDE, POTASSIUM CHLORIDE, SODIUM LACTATE AND CALCIUM CHLORIDE: 600; 310; 30; 20 INJECTION, SOLUTION INTRAVENOUS at 09:37

## 2024-10-23 RX ADMIN — KETOROLAC TROMETHAMINE 15 MG: 30 INJECTION, SOLUTION INTRAMUSCULAR at 11:14

## 2024-10-23 RX ADMIN — ACETAMINOPHEN 650 MG: 325 TABLET ORAL at 12:21

## 2024-10-23 RX ADMIN — GABAPENTIN 300 MG: 300 CAPSULE ORAL at 09:27

## 2024-10-23 ASSESSMENT — PAIN SCALES - GENERAL
PAINLEVEL_OUTOF10: 7
PAINLEVEL_OUTOF10: 5

## 2024-10-23 ASSESSMENT — PAIN DESCRIPTION - LOCATION
LOCATION: ABDOMEN
LOCATION: ABDOMEN

## 2024-10-23 ASSESSMENT — PAIN DESCRIPTION - DESCRIPTORS
DESCRIPTORS: ACHING
DESCRIPTORS: ACHING

## 2024-10-23 ASSESSMENT — PAIN DESCRIPTION - ORIENTATION: ORIENTATION: MID;UPPER

## 2024-10-23 NOTE — DISCHARGE INSTRUCTIONS
your pain.  Take this medication only as prescribed.  If your pain is mild to moderate, try taking Acetaminophen (Tylenol) 500 mg 1-2 tablets every 8 hours or as directed by your provider.  Avoid taking antiinflammatory medications (NSAID'S) such as Ibuprofen (Motrin, Advil) or Naproxen (Aleve).  These medications can be harmful to your stomach and cause bleeding and ulcers.  There is a complete list of NSAID medications to AVOID in your handbook.  Abdominal support (Spanx or body shaper) and heat (heating pad on low setting) are very helpful in managing pain after surgery.       Acid Reducing (\"heartburn/reflux\") Medication   Acid reducing medicine should have been prescribed at your pre-surgery visit.  It is recommended you take this medication every day even if you have no symptoms of reflux or heartburn.  If you were previously on a medication for reflux/heartburn you should continue the medication daily.      *It is common to experience reflux or heartburn after sleeve gastrectomy.  These symptoms can usually be managed with medication, diet and behavior changes.  In most cases symptoms improve or resolve after a few weeks to a couple of months.      Nausea Medication  You should have been prescribed medication for nausea at your pre-surgery visit.  If you are experiencing nausea, please take the medication as prescribed to try and get relief.  If the nausea medication is not effective, please call your surgeon's office.      Constipation   Constipation can be caused by pain medication and reduced food and water intake.  Drink at least 64 oz. fluid.  OK to use OTC medications such as Benefiber, Milk of Magnesia, Dulcolax, Miralax, senna       Vitamins   Calcium Citrate with Vitamin D-3 - Take 1200--1500 mg  each day.  Divide doses throughout the day.  Do not take more than 600 mg at one time.   Take at least 2 hours before or after your multivitamin and/or iron supplement.  Multivitamin containing Iron - 2

## 2024-10-23 NOTE — DISCHARGE SUMMARY
Discharge Summary    Date: 10/23/2024  Patient Name: Andre Wilson    YOB: 1970     Age: 54 y.o.    Admit Date: 10/22/2024  Discharge Date: 10/23/2024  Discharge Condition: Good    Admission Diagnosis  Morbid obesity [E66.01];Essential hypertension, malignant [I10];Morbid obesity with BMI of 40.0-44.9, adult [E66.01, Z68.41]      Discharge Diagnosis  Principal Problem:    Morbid obesity with BMI of 40.0-44.9, adult  Active Problems:    Morbid obesity    Essential hypertension, malignant  Resolved Problems:    * No resolved hospital problems. *      Hospital Stay  Narrative of Hospital Course:  The patient was admitted postop and started on clears.  Postop day 1 she started on full liquids was tolerating well and discharged home the afternoon pain controlled tolerating fluids and up and walking    Consultants:  IP CONSULT TO DIETITIAN    Surgeries/procedures Performed:      Treatments:    Surgery        Discharge Plan/Disposition:  Home    Hospital/Incidental Findings Requiring Follow Up:    Patient Instructions:    Diet:    Activity:  For number of days (if applicable):      Other Instructions: Bariatric full liquid diet    Provider Follow-Up:   No follow-ups on file.     Significant Diagnostic Studies:    Recent Labs:  Admission on 10/22/2024  POC Glucose                                   Date: 10/22/2024  Value: 92          Ref range: 65 - 117 mg/dL     Status: Final                Comment: (NOTE)  The FDA has indicated that no capillary point of care blood glucose  monitoring systems are approved for use in \"critically ill\" patients,  however they have not defined this population. The College of  American Pathologists has recommended that these devices should not  be used in cases such as severe hypotension, dehydration, shock, and  hyperglycemic-hyperosmolar state, amongst others.  Venous or arterial  collection is the recommended specimen for testing these patients.    Performed by:

## 2024-10-23 NOTE — CONSULTS
NUTRITION    Chart reviewed.  Post-op bariatric diet instruction completed.  Pt expressed dislike of protein shakes d/t being diary-based, has been tolerating them on liver-shrinking diet by drinking them via straw very quickly -- recognizes that this is not an option for her post-surgery. RD discussed trying Clear protein supplements (ordered Prosource Gelatein for her to try) vs adding flavorless protein powder to liquids she does llike.     Will gladly follow up for additional questions as needed.  Thank you.     Maureen Johnson RD, ADRY   ---------------------------------------------------------------------------------------------------------------------------------------------------------------------------------------------------------------------------------------------------------------------------------------

## 2024-10-23 NOTE — DISCHARGE INSTR - DIET
Weight Loss Surgery Post Op information    You should drink 64 oz of water/clear liquids to prevent dehydration. Dehydration is the most common reason for readmission.    Sugar free clear liquids are acceptable to drink after surgery.   Sugar free, calorie free, non-carbonated beverage examples:  Water, Crystal light, sugar free Marquez, sugar free Jose Maria-aid, Diet Snapple (non-caloric only), Non-carbonated water (Itaqc5B), decaf coffee or tea, fat free broth, sugar free popsicles, sugar free gelatin.    Hydration is the number one goal. It is more important to drink 64 oz of clear liquids than it is to get all 3 protein shakes in the first few days.        3.   You should have your meal replacement shake ready at home.   Shake Examples: Premier Protein, Orgain Protein, [place] Instant Breakfast Light Start (no sugar added), Boost Glucose Control, Bariatric Advantage or Unjury (mixed with milk), Boost Calorie Smart, Glucerna Hunger Smart, Ensure High Protein, Ensure Max Protein.     Aim for 2-3 shakes per day. Protein shakes do not count towards the 64 oz of clear liquid goal per day. You should be able to get 3 shakes per day by the end of your first week.      4. Sip! Do NOT gulp drink and NO STRAWS. Stop when full. Sip, sip, sip 64 oz clear liquids per day.                 It is okay to take sips of water and then sips of shake, rotating back and forth throughout the day. It is also okay for patients to designate 30 minutes just for a shake, as long as they are sipping plenty of clear liquids between shakes.       5. Continue to follow a full liquids diet after surgery until your first office visit. This will be around 2 weeks after discharge. Do NOT start the next phase until your doctor instructs you to at the office visit.       6. Have your vitamins ready to take at home. Refer to your book for when to take, brands, types, etc.              2 chewable Multivitamin              1200 mg Calcium citrate (600 mg,

## 2024-10-23 NOTE — PROGRESS NOTES
[facility]    POD #1    Subjective     Doing well, tolerating clears, no N/V, OOB    Objective     Patient Vitals for the past 24 hrs:   Temp Pulse Resp BP SpO2   10/23/24 0542 -- 81 -- -- --   10/23/24 0401 97.9 °F (36.6 °C) 83 18 (!) 145/70 97 %   10/23/24 0340 -- 78 -- -- --   10/23/24 0141 -- 73 -- -- --   10/22/24 2315 98.1 °F (36.7 °C) 74 16 118/68 95 %   10/22/24 2138 -- 95 -- -- --   10/22/24 1945 97.7 °F (36.5 °C) 82 18 (!) 153/87 100 %   10/22/24 1943 -- 80 -- -- --   10/22/24 1800 -- 77 -- -- --   10/22/24 1700 97.7 °F (36.5 °C) 84 20 (!) 148/72 98 %   10/22/24 1400 -- 74 -- -- --   10/22/24 1246 -- 82 20 -- 99 %   10/22/24 1200 -- 91 -- -- --   10/22/24 1145 98.1 °F (36.7 °C) 80 16 (!) 154/86 97 %   10/22/24 1100 98 °F (36.7 °C) 73 15 (!) 152/83 98 %   10/22/24 1045 -- 82 18 (!) 155/82 94 %   10/22/24 1030 -- 73 15 (!) 158/76 97 %   10/22/24 1020 -- -- 16 -- --   10/22/24 1015 -- 85 18 (!) 156/83 100 %   10/22/24 1010 -- -- 16 -- --   10/22/24 1005 -- -- 16 -- --   10/22/24 1000 -- 78 19 (!) 151/79 97 %   10/22/24 0951 -- -- 20 -- --   10/22/24 0945 -- 75 20 (!) 160/80 99 %   10/22/24 0940 -- 79 20 (!) 152/83 99 %   10/22/24 0935 -- 79 22 (!) 152/75 99 %   10/22/24 0930 -- 75 21 (!) 153/71 98 %   10/22/24 0925 -- 71 21 (!) 142/70 99 %   10/22/24 0920 -- 80 23 (!) 154/88 98 %   10/22/24 0919 97.2 °F (36.2 °C) 83 17 (!) 154/75 98 %         Intake/Output Summary (Last 24 hours) at 10/23/2024 0806  Last data filed at 10/23/2024 0457  Gross per 24 hour   Intake 1985.04 ml   Output 20 ml   Net 1965.04 ml        PE  Pulm - CTAB  CV - RRR  Abd - soft, Nd, BS present, incisions c/d/i    Labs  Lab Results   Component Value Date/Time     10/23/2024 05:12 AM    K 4.5 10/23/2024 05:12 AM     10/23/2024 05:12 AM    CO2 27 10/23/2024 05:12 AM    BUN 12 10/23/2024 05:12 AM    CREATININE 0.94 10/23/2024 05:12 AM    GLUCOSE 90 10/23/2024 05:12 AM    GLUCOSE 97

## 2024-10-24 ENCOUNTER — TELEPHONE (OUTPATIENT)
Age: 54
End: 2024-10-24

## 2024-10-24 NOTE — TELEPHONE ENCOUNTER
I called the patient she informed me she was discharged yesterday at 3:00 pm. I told her I will call tomorrow to do my 48 hour post op call. She acknowledged understanding and thanked me for the call.

## 2024-10-25 ENCOUNTER — TELEPHONE (OUTPATIENT)
Age: 54
End: 2024-10-25

## 2024-10-25 NOTE — TELEPHONE ENCOUNTER
Bariatric Post Op Call 48 hour    Hydration: Less than 32 ounces of water daily is fair to poor (Goal is 64 ounces per day)  Poor [] Fair []  Good [] Great []    Amount: 56 ounces yesterday, 42 ounces at the time of this call.     Comment:     Ambulation:( walking throughout the day, at least every 2 hours while awake. Patient should be up and out of bed most of the day.)   Poor [] Fair []  Good [] Great [x]    Comment:    Urine Color: Question of any odor and color (should be samra, pale, and clear)   Dark [] Samra [] Pale [] Clear [x]    Comment:No odor     Diet: Question any nausea and/or vomiting.            Protein intake (ultimately goal is 60 grams of protein daily, but at 2 days post op they should be working towards this and may not be at goal yet)  Poor [] Fair []  Good [] Great [x]    Comment: No nausea and or vomiting      Bowel movements: Question of any constipation- haven't had any bowel movements for more than 3 days. This could be related to protein intake and/or narcotic pain medication usage.     Comment: No BM, reported taking the miralax once a day. I told her if no BM by Saturday take the miralax twice a day. If no BM by Sunday add a dose of mom.          Use of incentive spirometer:  Yes [x]  No []    Comment: Reported not using. I asked her to begin using it.    Incision: (No redness, pain, swelling or fever)     Healing Well [x] Redness [] Pain [] Drainage [] Swelling []    Comment: No Fever     Pain: Right sided incisional (usually the largest incision with deep stitch) abdominal pain is normal (should be less than 3).  Pain 0 - 10: 5    Comment (are they taking pain medication and is it helping? Abdominal support / splinting/ ice or heat?): Taking Tylenol and Dilaudid as needed. Recommended using a pillow to splint the abdomen when changing positions which causes her to use the abdominal muscles. You can purchase an abdominal binder but only wear it during the day while up and about or

## 2024-11-06 ENCOUNTER — OFFICE VISIT (OUTPATIENT)
Age: 54
End: 2024-11-06

## 2024-11-06 VITALS
BODY MASS INDEX: 37.55 KG/M2 | RESPIRATION RATE: 19 BRPM | HEART RATE: 83 BPM | TEMPERATURE: 98.3 F | DIASTOLIC BLOOD PRESSURE: 73 MMHG | WEIGHT: 225.4 LBS | OXYGEN SATURATION: 99 % | SYSTOLIC BLOOD PRESSURE: 106 MMHG | HEIGHT: 65 IN

## 2024-11-06 DIAGNOSIS — Z09 SURGICAL FOLLOW-UP CARE: ICD-10-CM

## 2024-11-06 DIAGNOSIS — E66.01 MORBID OBESITY: Primary | ICD-10-CM

## 2024-11-06 PROCEDURE — 99024 POSTOP FOLLOW-UP VISIT: CPT | Performed by: NURSE PRACTITIONER

## 2024-11-06 ASSESSMENT — PATIENT HEALTH QUESTIONNAIRE - PHQ9
SUM OF ALL RESPONSES TO PHQ QUESTIONS 1-9: 0
SUM OF ALL RESPONSES TO PHQ9 QUESTIONS 1 & 2: 0
2. FEELING DOWN, DEPRESSED OR HOPELESS: NOT AT ALL
1. LITTLE INTEREST OR PLEASURE IN DOING THINGS: NOT AT ALL

## 2024-11-06 ASSESSMENT — ANXIETY QUESTIONNAIRES
4. TROUBLE RELAXING: NOT AT ALL
2. NOT BEING ABLE TO STOP OR CONTROL WORRYING: NOT AT ALL
3. WORRYING TOO MUCH ABOUT DIFFERENT THINGS: NOT AT ALL
GAD7 TOTAL SCORE: 0
IF YOU CHECKED OFF ANY PROBLEMS ON THIS QUESTIONNAIRE, HOW DIFFICULT HAVE THESE PROBLEMS MADE IT FOR YOU TO DO YOUR WORK, TAKE CARE OF THINGS AT HOME, OR GET ALONG WITH OTHER PEOPLE: NOT DIFFICULT AT ALL
5. BEING SO RESTLESS THAT IT IS HARD TO SIT STILL: NOT AT ALL
6. BECOMING EASILY ANNOYED OR IRRITABLE: NOT AT ALL
7. FEELING AFRAID AS IF SOMETHING AWFUL MIGHT HAPPEN: NOT AT ALL
1. FEELING NERVOUS, ANXIOUS, OR ON EDGE: NOT AT ALL

## 2024-11-06 NOTE — PROGRESS NOTES
2 weeks status post Sleeve gastrectomy   Pt reports doing well on liquids .    No complaints of pain  Pt reports no nausea and no vomiting  Sheis drinking approximately 60+ oz of water daily.   She is drinking and eating 40-45 grams of protein daily. She stuggles with milk products.      She is taking all bariatric vitamins without issue.     Total weight loss since surgery 24lbs  Weight loss since last visit 24lbs    /73 (Site: Left Upper Arm, Position: Sitting, Cuff Size: Large Adult)   Pulse 83   Temp 98.3 °F (36.8 °C) (Oral)   Resp 19   Ht 1.651 m (5' 5\")   Wt 102.2 kg (225 lb 6.4 oz)   LMP 09/13/2023   SpO2 99%   BMI 37.51 kg/m²            Ms. Wilson has a reminder for a \"due or due soon\" health maintenance. I have asked that she contact her primary care provider for follow-up on this health maintenance.      Physical Examination: General appearance - alert, well appearing, and in no distress,  Chest - clear to auscultation bilaterally  Heart - normal rate, regular rhythm, normal S1, S2, no murmurs, rubs, clicks or gallops  Abdomen - soft, nontender, nondistended  scars from previous incisions healing without erythema or induration    A/P    Doing well 2 weeks  Status post laparoscopic Sleeve gastrectomy  Diet advanced to soft food  Focus on 50-60 grams of protein daily.   Supplement with unflavored protein powder daily.   Encouraged water intake to at least 64 oz of non-carbonated/no calorie beverages.  Continue vitamins   Continue PPI  No lifting greater than 20 lbs  Follow up 2 weeks    Path   Stomach, sleeve gastrectomy:   Benign gastric mucosa.   No malignancy identified.     Pt verbalized understanding and questions were answered to the best of my knowledge and ability.         Courtney Burrows, HUA - NP

## 2024-11-06 NOTE — PATIENT INSTRUCTIONS
add these foods to your diet.  Protein - include with every meal  Egg or egg substitute    Low fat or fat-free cottage cheese    Low fat or fat-free yogurt   Low fat Greek yogurt   Fat-free, 1% milk, or Lactaid milk   Low-fat or vegetarian refried beans   Well-cooked beans and lentils  Fat-free or 2% reduced-fat cheese   Hummus   Low fat soup     Snacks/Other Options:  Whole wheat crackers  Sugar free fudgsicles   Sugar free cocoa   No sugar added pudding         Fruits and Vegetables  Applesauce (no sugar added)  Canned fruit (no sugar added)  Fresh soft peeled fruits (melons, banana, avocado, berries)  Any soft cooked vegetables   Mashed potatoes, Sweet potatoes, baked potatoes (no skin)  Condiments  Fat free non-stick spray  Herbs and spices  Lite butter, margarine, canola oil, olive oil  Reduced-fat or fat-free roca  Reduced-fat or fat-free salad dressing  Reduced-fat or fat-free cream cheese  Reduced-fat or fat-free sour cream  Lemon juice  Salt, pepper, mustard, ketchup, salsa    Prepare food to the appropriate texture.    Sample Meal Plan:  Soft and Mushy    Breakfast ½ cup plain oatmeal with protein powder. Add cinnamon, nutmeg, Splenda brown sugar as desired for flavor 20-25 grams protein   Snack (optional) High protein gelatin (recipe on www.unjury.com) 10 grams protein   Lunch ½ cup low fat cottage cheese or Greek yogurt with soft fruit 10 - 15 grams protein    Snack (optional) High protein pudding or high protein popsicle (recipe on www.unjury.com) 10 grams protein   Dinner ¼ cup low-fat well cooked beans with low-fat cheese sprinkled on top  ¼ cup no sugar added applesauce (can sprinkle protein powder) 5-8 grams protein  5-10  grams protein

## 2024-11-06 NOTE — PROGRESS NOTES
Identified pt with two pt identifiers (name and ). Reviewed chart in preparation for visit and have obtained necessary documentation.    Andre Wilson is a 54 y.o. female Post-Op Check (GASTRECTOMY SLEEVE LAPAROSCOPIC WITH EGD 10/22/2024)  .    Vitals:    24 0950   BP: 106/73   Site: Left Upper Arm   Position: Sitting   Cuff Size: Large Adult   Pulse: 83   Resp: 19   Temp: 98.3 °F (36.8 °C)   TempSrc: Oral   SpO2: 99%   Weight: 102.2 kg (225 lb 6.4 oz)   Height: 1.651 m (5' 5\")          1. Have you been to the ER, urgent care clinic since your last visit?  Hospitalized since your last visit?  no     2. Have you seen or consulted any other health care providers outside of the [facility] since your last visit?  Include any pap smears or colon screening.  no

## 2024-11-12 ENCOUNTER — TELEPHONE (OUTPATIENT)
Age: 54
End: 2024-11-12

## 2024-11-13 ENCOUNTER — OFFICE VISIT (OUTPATIENT)
Age: 54
End: 2024-11-13

## 2024-11-13 VITALS
HEIGHT: 65 IN | BODY MASS INDEX: 37.59 KG/M2 | HEART RATE: 76 BPM | TEMPERATURE: 97.2 F | SYSTOLIC BLOOD PRESSURE: 135 MMHG | DIASTOLIC BLOOD PRESSURE: 84 MMHG | WEIGHT: 225.6 LBS | RESPIRATION RATE: 13 BRPM

## 2024-11-13 DIAGNOSIS — Z09 SURGICAL FOLLOW-UP CARE: ICD-10-CM

## 2024-11-13 DIAGNOSIS — E66.01 MORBID OBESITY: Primary | ICD-10-CM

## 2024-11-13 PROCEDURE — 99024 POSTOP FOLLOW-UP VISIT: CPT | Performed by: NURSE PRACTITIONER

## 2024-11-13 RX ORDER — CYCLOBENZAPRINE HCL 10 MG
5-10 TABLET ORAL NIGHTLY PRN
Qty: 10 TABLET | Refills: 0 | Status: SHIPPED | OUTPATIENT
Start: 2024-11-13 | End: 2024-11-23

## 2024-11-13 ASSESSMENT — PATIENT HEALTH QUESTIONNAIRE - PHQ9
SUM OF ALL RESPONSES TO PHQ9 QUESTIONS 1 & 2: 0
2. FEELING DOWN, DEPRESSED OR HOPELESS: NOT AT ALL
1. LITTLE INTEREST OR PLEASURE IN DOING THINGS: NOT AT ALL
SUM OF ALL RESPONSES TO PHQ QUESTIONS 1-9: 0

## 2024-11-13 NOTE — TELEPHONE ENCOUNTER
I called and spoke with the patient after receiving a [employer] message this morning, complaining of abdominal pain and requesting an imaging study. I told her she should come in to be seen and physically assessed because of her complaint. She acknowledged understanding and was scheduled for 3:40 pm.

## 2024-11-13 NOTE — PROGRESS NOTES
3 weeks status post Sleeve gastrectomy   Pt reports doing well on liquids and soft foods .    She states that she was pushing her couch and started having right sided abdominal pain. She has been taking tylenol but has not been able to get comfortable.   Pt reports no nausea and no vomiting  Sheis drinking approximately 60+ oz of water daily.   She is drinking and eating 40-45 grams of protein daily.         /84 (Site: Left Upper Arm, Position: Sitting, Cuff Size: Large Adult)   Pulse 76   Temp 97.2 °F (36.2 °C) (Oral)   Resp 13   Ht 1.651 m (5' 5\")   Wt 102.3 kg (225 lb 9.6 oz)   LMP 09/13/2023   BMI 37.54 kg/m²        Ms. Wilson has a reminder for a \"due or due soon\" health maintenance. I have asked that she contact her primary care provider for follow-up on this health maintenance.      Physical Examination: General appearance - alert, well appearing, and in no distress,    Abdomen - soft, tender right side of abdomen at larger trocar site, nondistended, no hernia noted  scars from previous incisions healing without erythema or induration    A/P    Doing well 3 weeks  Status post laparoscopic Sleeve gastrectomy  Musculoskeletal pain due to deep stitch  Start Flexeril 5-10 mg night as needed to help. She may take tylenol to help with discomfort. Use heating pad  Focus on 50-60 grams of protein daily.   Supplement with unflavored protein powder daily.   Encouraged water intake to at least 64 oz of non-carbonated/no calorie beverages.   Continue PPI  Continue vitamins.   Follow up in 1 week vv      HUA Frausto NP

## 2024-11-13 NOTE — PROGRESS NOTES
Identified patient with two patient identifiers (name and ). Reviewed chart in preparation for visit and have obtained necessary documentation.    Andre Wilson is a 54 y.o. female  Chief Complaint   Patient presents with    Post-Op Check     3 week s/p GASTRECTOMY SLEEVE LAPAROSCOPIC WITH EGD/ abdominal pain     /84 (Site: Left Upper Arm, Position: Sitting, Cuff Size: Large Adult)   Pulse 76   Temp 97.2 °F (36.2 °C) (Oral)   Resp 13   Ht 1.651 m (5' 5\")   Wt 102.3 kg (225 lb 9.6 oz)   LMP 2023   BMI 37.54 kg/m²     1. Have you been to the ER, urgent care clinic since your last visit?  Hospitalized since your last visit?no    2. Have you seen or consulted any other health care providers outside of the [facility] since your last visit?  Include any pap smears or colon screening. no

## 2024-11-20 ENCOUNTER — TELEMEDICINE (OUTPATIENT)
Age: 54
End: 2024-11-20

## 2024-11-20 DIAGNOSIS — E66.01 MORBID OBESITY: Primary | ICD-10-CM

## 2024-11-20 DIAGNOSIS — Z09 SURGICAL FOLLOW-UP CARE: ICD-10-CM

## 2024-11-20 PROCEDURE — 99024 POSTOP FOLLOW-UP VISIT: CPT | Performed by: NURSE PRACTITIONER

## 2024-11-20 NOTE — PATIENT INSTRUCTIONS
What you need to know:  1 .Advance your diet to moist meats. Follow the handout that you were given today in the office.   2. Take the recommended vitamins daily  3 No lifting greater than 40 lbs.   4. You can do light jogging, moderate walking and a recumbent bike.   5 Follow up in 2 weeks.  6. You may go into a pool.   7. If you are not able to tolerate liquids, soft foods or moist meats.   Please call our office. [phone]  8. If you have vomiting and persistent epigastric pain or chest pain. You should call our office, the doctor on-call or go to the emergency room.         Constipation  Benefiber, Miralax & similar (once or twice daily)  Milk of Magnesia (daily as needed)  Dulcolax suppository  Fleets Enema    Moist Meats   What is this diet?  This phase adds moist meats in addition to foods in Soft & Mushy  Lean protein sources  When do I begin?  When directed by your NP or surgeon, usually 4 weeks after surgery          What new foods can I eat?  Moist meat and poultry  Moist fish and seafood          Shopping Lists      Protein - include with every meal  Tuna packed in water   [place] (canned, frozen, fresh)   White flaky fish (telma, cod, flounder, tilapia)   Canned chicken packed in water    96-99% fat free thinly sliced deli meat (ham, turkey, chicken)   Silken Tofu   Skinless turkey or chicken (prepare to a soft texture)   Lean ground meat  Lean pork (cooked until very tender, cut into small pieces)     Sample Meal Plan:  Moist Meats    Breakfast ½ cup soft cooked eggs (2) 16 grams protein   Snack (optional) Low-fat string cheese 5 grams protein   Lunch 2 slices of lean deli turkey (2 oz.), ¼ cup soft fruit or  ½ cup tuna salad made with low-fat mayonnaise 14 grams protein   14 grams protein   Snack (optional) Greek yogurt or low-fat cottage cheese or low-fat string cheese 8-15 grams protein   Dinner Soft/flaky fish (2 oz.)  ¼ cup soft cooked vegetables 14 grams protein

## 2024-11-20 NOTE — PROGRESS NOTES
4 weeks status post Sleeve gastrectomy   Pt reports doing well on liquids and soft foods. .    Pain is much better  Pt reports no nausea and no vomiting  Sheis drinking approximately 64 oz of water daily.   She is drinking and eating 40-45 grams of protein daily.       She is taking all bariatric vitamins without issue.     Total weight loss since surgery 22lbs      LMP 09/13/2023          Ms. Wilson has a reminder for a \"due or due soon\" health maintenance. I have asked that she contact her primary care provider for follow-up on this health maintenance.      Physical Examination: General appearance - alert, well appearing, and in no distress,  Chest - no respiratory distress    Abdomen - incisions healing well.     A/P    Doing well 4 weeks  Status post laparoscopic Sleeve gastrectomy  Diet advanced to soft meats  Focus on 50-60 grams of protein daily.   Supplement with unflavored protein powder daily.   Encouraged water intake to at least 64 oz of non-carbonated/no calorie beverages.  Continue vitamins.    Discontinue PPI  No lifting greater than 40 lbs  Follow up 2 weeks    Pt verbalized understanding and questions were answered to the best of my knowledge and ability.        HUA Frausto - NP

## 2024-11-20 NOTE — PROGRESS NOTES
Identified patient with two patient identifiers (name and ). Reviewed chart in preparation for visit and have obtained necessary documentation.    Andre Wilson is a 54 y.o. female  No chief complaint on file.    LMP 2023     1. Have you been to the ER, urgent care clinic since your last visit?  Hospitalized since your last visit?no    2. Have you seen or consulted any other health care providers outside of the [facility] since your last visit?  Include any pap smears or colon screening. no

## 2024-12-04 ENCOUNTER — TELEMEDICINE (OUTPATIENT)
Age: 54
End: 2024-12-04

## 2024-12-04 DIAGNOSIS — E66.01 MORBID OBESITY: Primary | ICD-10-CM

## 2024-12-04 DIAGNOSIS — Z09 SURGICAL FOLLOW-UP CARE: ICD-10-CM

## 2024-12-04 PROCEDURE — 99024 POSTOP FOLLOW-UP VISIT: CPT | Performed by: NURSE PRACTITIONER

## 2024-12-04 ASSESSMENT — PATIENT HEALTH QUESTIONNAIRE - PHQ9
SUM OF ALL RESPONSES TO PHQ9 QUESTIONS 1 & 2: 0
SUM OF ALL RESPONSES TO PHQ QUESTIONS 1-9: 0
2. FEELING DOWN, DEPRESSED OR HOPELESS: NOT AT ALL
1. LITTLE INTEREST OR PLEASURE IN DOING THINGS: NOT AT ALL

## 2024-12-04 NOTE — PROGRESS NOTES
Identified patient with two patient identifiers (name and ). Reviewed chart in preparation for visit and have obtained necessary documentation.    Andre Wilson is a 54 y.o. female  Chief Complaint   Patient presents with    Post-Op Check     S/P Six - Weeks ERAS Procedure     LMP 2023     1. Have you been to the ER, urgent care clinic since your last visit?  Hospitalized since your last visit?no    2. Have you seen or consulted any other health care providers outside of the [facility] since your last visit?  Include any pap smears or colon screening. no

## 2024-12-04 NOTE — PROGRESS NOTES
6 weeks status post Sleeve gastrectomy   Pt reports doing well on liquids and soft foods .    No complaints of pain.   Pt reports no nausea and no vomiting  Sheis drinking approximately 64 oz of water daily.   She is drinking and eating 60 grams of protein daily.       She is taking all bariatric vitamins without issue.     Total weight gain since surgery 2lbs  Weight loss since last visit 22lbs    LMP 09/13/2023          Ms. Wilson has a reminder for a \"due or due soon\" health maintenance. I have asked that she contact her primary care provider for follow-up on this health maintenance.      Physical Examination: General appearance - alert, well appearing, and in no distress,  Chest -no respiratory distress    Abdomen - incision healed well per patient    A/P    Doing well 6 weeks  Status post laparoscopic Sleeve gastrectomy  Diet advanced to solid foods.   Advised patient regard to diet that is high-protein, low-fat, low-sugar, limited carbohydrates. Strive for 50-60 grams of protein daily. If having a snack, foods that are protein or fiber rich.. No eating/drinking together, chew foods well, and portion control. Measure meals. Discussed snacking behavior and to stiill pay attention to behavioral factor and habits. Drink at least 40-64 ounces of water or non-calorie/non-carbonated beverages daily. Continue vitamin regiment daily. Exercise at least 3 days a week with cardiovascular and strength training. Patient to follow up in 10 weeks.. Advised to call office if any questions/concerns.     Andre Wilson, was evaluated through a synchronous (real-time) audio-video encounter. The patient (or guardian if applicable) is aware that this is a billable service, which includes applicable co-pays. This Virtual Visit was conducted with patient's (and/or legal guardian's) consent. Patient identification was verified, and a caregiver was present when appropriate.   The patient was located at Home: [address] [place] [address]

## 2025-02-23 ENCOUNTER — OFFICE VISIT (OUTPATIENT)
Age: 55
End: 2025-02-23

## 2025-02-23 VITALS
WEIGHT: 216.8 LBS | SYSTOLIC BLOOD PRESSURE: 139 MMHG | DIASTOLIC BLOOD PRESSURE: 88 MMHG | BODY MASS INDEX: 36.12 KG/M2 | HEART RATE: 90 BPM | TEMPERATURE: 97.8 F | HEIGHT: 65 IN | RESPIRATION RATE: 18 BRPM | OXYGEN SATURATION: 98 %

## 2025-02-23 DIAGNOSIS — J06.9 VIRAL UPPER RESPIRATORY TRACT INFECTION: Primary | ICD-10-CM

## 2025-04-21 ENCOUNTER — TELEPHONE (OUTPATIENT)
Age: 55
End: 2025-04-21

## 2025-04-21 NOTE — TELEPHONE ENCOUNTER
LM for pt to call and schedule 8 month bariatric exam. Letter sent. [employer] message also sent. [facility]

## 2025-04-22 ENCOUNTER — TELEMEDICINE (OUTPATIENT)
Age: 55
End: 2025-04-22
Payer: MEDICAID

## 2025-04-22 VITALS — WEIGHT: 203 LBS | BODY MASS INDEX: 33.82 KG/M2 | HEIGHT: 65 IN

## 2025-04-22 DIAGNOSIS — Z98.84 S/P LAPAROSCOPIC SLEEVE GASTRECTOMY: ICD-10-CM

## 2025-04-22 DIAGNOSIS — E53.8 VITAMIN B12 DEFICIENCY: ICD-10-CM

## 2025-04-22 DIAGNOSIS — D64.9 ANEMIA, UNSPECIFIED TYPE: ICD-10-CM

## 2025-04-22 DIAGNOSIS — E55.9 VITAMIN D DEFICIENCY: ICD-10-CM

## 2025-04-22 DIAGNOSIS — E66.01 MORBID OBESITY (HCC): Primary | ICD-10-CM

## 2025-04-22 DIAGNOSIS — Z00.00 ENCOUNTER FOR PREVENTIVE CARE: ICD-10-CM

## 2025-04-22 PROCEDURE — 99213 OFFICE O/P EST LOW 20 MIN: CPT | Performed by: NURSE PRACTITIONER

## 2025-04-22 ASSESSMENT — PATIENT HEALTH QUESTIONNAIRE - PHQ9
SUM OF ALL RESPONSES TO PHQ QUESTIONS 1-9: 0
1. LITTLE INTEREST OR PLEASURE IN DOING THINGS: NOT AT ALL
2. FEELING DOWN, DEPRESSED OR HOPELESS: NOT AT ALL

## 2025-04-22 ASSESSMENT — ENCOUNTER SYMPTOMS
SHORTNESS OF BREATH: 0
NAUSEA: 0
ABDOMINAL PAIN: 0
VOMITING: 0

## 2025-04-22 NOTE — PROGRESS NOTES
Identified pt with two pt identifiers (name and ). Reviewed chart in preparation for visit and have obtained necessary documentation.    Andre Wilson is a 55 y.o. female  Chief Complaint   Patient presents with    Weight Management     6 months s/p Gastric Sleeve      Ht 1.651 m (5' 5\")   Wt 92.1 kg (203 lb)   LMP 2023   BMI 33.78 kg/m²     1. Have you been to the ER, urgent care clinic since your last visit?  Hospitalized since your last visit?no    2. Have you seen or consulted any other health care providers outside of the [facility] since your last visit?  Include any pap smears or colon screening. no  
if any questions/concerns.     Andre Wilson, was evaluated through a synchronous (real-time) audio-video encounter. The patient (or guardian if applicable) is aware that this is a billable service, which includes applicable co-pays. This Virtual Visit was conducted with patient's (and/or legal guardian's) consent. Patient identification was verified, and a caregiver was present when appropriate.   The patient was located at Home: [address]  [place] [address]  Provider was located at Facility (Appt Dept): [address]  [place] [address]  Confirm you are appropriately licensed, registered, or certified to deliver care in the state where the patient is located as indicated above. If you are not or unsure, please re-schedule the visit: Yes, I confirm.        --HUA Frausto NP on 4/22/2025 at 1:27 PM    An electronic signature was used to authenticate this note.     No follow-ups on file.    An electronic signature was used to authenticate this note.    --HUA Frausto NP

## 2025-05-14 RX ORDER — POLYETHYLENE GLYCOL 3350 17 G/17G
POWDER, FOR SOLUTION ORAL
Qty: 510 G | Refills: 1 | Status: SHIPPED | OUTPATIENT
Start: 2025-05-14 | End: 2025-08-12

## 2025-05-20 RX ORDER — ESTRADIOL 0.05 MG/D
1 PATCH, EXTENDED RELEASE TRANSDERMAL
Qty: 8 PATCH | Refills: 0 | Status: SHIPPED | OUTPATIENT
Start: 2025-05-22

## 2025-06-16 RX ORDER — ESTRADIOL 0.05 MG/D
PATCH, EXTENDED RELEASE TRANSDERMAL
Qty: 8 PATCH | Refills: 1 | Status: SHIPPED | OUTPATIENT
Start: 2025-06-16

## 2025-06-23 ENCOUNTER — HOSPITAL ENCOUNTER (OUTPATIENT)
Facility: HOSPITAL | Age: 55
Discharge: HOME | End: 2025-06-26
Payer: MEDICAID

## 2025-06-23 ENCOUNTER — RESULTS FOLLOW-UP (OUTPATIENT)
Dept: PRIMARY CARE CLINIC | Facility: CLINIC | Age: 55
End: 2025-06-23

## 2025-06-23 VITALS — HEIGHT: 65 IN | WEIGHT: 209 LBS | BODY MASS INDEX: 34.82 KG/M2

## 2025-06-23 DIAGNOSIS — Z12.31 VISIT FOR SCREENING MAMMOGRAM: ICD-10-CM

## 2025-06-23 PROCEDURE — 77063 BREAST TOMOSYNTHESIS BI: CPT

## 2025-08-11 RX ORDER — ESTRADIOL 0.05 MG/D
PATCH, EXTENDED RELEASE TRANSDERMAL
Qty: 8 PATCH | Refills: 0 | Status: SHIPPED | OUTPATIENT
Start: 2025-08-11

## 2025-08-13 RX ORDER — POLYETHYLENE GLYCOL 3350 17 G/17G
POWDER, FOR SOLUTION ORAL
Qty: 510 G | Refills: 1 | Status: SHIPPED | OUTPATIENT
Start: 2025-08-13 | End: 2025-11-11

## 2025-08-26 ENCOUNTER — TELEPHONE (OUTPATIENT)
Age: 55
End: 2025-08-26

## 2025-09-02 ENCOUNTER — TELEMEDICINE (OUTPATIENT)
Age: 55
End: 2025-09-02
Payer: MEDICAID

## 2025-09-02 DIAGNOSIS — E66.01 MORBID OBESITY (HCC): Primary | ICD-10-CM

## 2025-09-02 DIAGNOSIS — Z98.84 S/P LAPAROSCOPIC SLEEVE GASTRECTOMY: ICD-10-CM

## 2025-09-02 PROCEDURE — 99212 OFFICE O/P EST SF 10 MIN: CPT | Performed by: NURSE PRACTITIONER

## 2025-09-02 ASSESSMENT — PATIENT HEALTH QUESTIONNAIRE - PHQ9
1. LITTLE INTEREST OR PLEASURE IN DOING THINGS: NOT AT ALL
SUM OF ALL RESPONSES TO PHQ QUESTIONS 1-9: 0
2. FEELING DOWN, DEPRESSED OR HOPELESS: NOT AT ALL
SUM OF ALL RESPONSES TO PHQ QUESTIONS 1-9: 0

## 2025-09-02 ASSESSMENT — ENCOUNTER SYMPTOMS
NAUSEA: 0
CHEST TIGHTNESS: 0
VOMITING: 0
ABDOMINAL PAIN: 0

## (undated) DEVICE — AIR SHEET,LAT,COMFORT GLIDE, BLEND 40X80: Brand: MEDLINE

## (undated) DEVICE — HYPODERMIC SAFETY NEEDLE: Brand: MAGELLAN

## (undated) DEVICE — SOLUTION IV 1000ML 0.9% SOD CHL PH 5 INJ USP VIAFLX PLAS

## (undated) DEVICE — GENERAL LAPAROSCOPY - SMH: Brand: MEDLINE INDUSTRIES, INC.

## (undated) DEVICE — MASK ANES INF SZ 2 PREM TAIL VLV INFL PRT UNSCENTED SGL PT

## (undated) DEVICE — 4-PORT MANIFOLD: Brand: NEPTUNE 2

## (undated) DEVICE — SYSTEM EVAC SMOKE LAPARSCOPIC

## (undated) DEVICE — TROCAR: Brand: KII® SLEEVE

## (undated) DEVICE — Device

## (undated) DEVICE — SYRINGE MED 30ML STD CLR PLAS LUERLOCK TIP N CTRL DISP

## (undated) DEVICE — FCPS RAD JAW 4LC 240CM W/NDL -- BX/20 RADIAL JAW 4

## (undated) DEVICE — DISSECTOR SURG US 48 CM CRV JAW CRDLSS STRL SONICISION 7 LF

## (undated) DEVICE — FORCEPS BX L240CM JAW DIA2.4MM ORNG L CAP W/ NDL DISP RAD

## (undated) DEVICE — SHEET TRNSF DISPOSABLE HOVERMATT 100 PER CA

## (undated) DEVICE — ORISE PROKNIFE 1.5 MM ELECTRODE: Brand: ORISE™ PROKNIFE

## (undated) DEVICE — Z DISCONTINUED USE 2718697 SUTURE VICRYL 0 L27IN ABSRB UD CT L40MM 1/2 CIR TAPERPOINT J280H

## (undated) DEVICE — ENDO CARRY-ON PROCEDURE KIT INCLUDES ENZYMATIC SPONGE, GAUZE, BIOHAZARD LABEL, TRAY, LUBRICANT, DIRTY SCOPE LABEL, WATER LABEL, TRAY, DRAWSTRING PAD, AND DEFENDO 4-PIECE KIT.: Brand: ENDO CARRY-ON PROCEDURE KIT

## (undated) DEVICE — VISIGI 3D®  CALIBRATION SYSTEM  SIZE 40FR STD W/ BULB: Brand: BOEHRINGER® VISIGI 3D™ SLEEVE GASTRECTOMY CALIBRATION SYSTEM, SIZE 40FR W/BULB

## (undated) DEVICE — LAPAROSCOPIC TROCAR SLEEVE/SINGLE USE: Brand: KII® OPTICAL ACCESS SYSTEM

## (undated) DEVICE — SOLUTION ANTIFOG VIS SYS CLEARIFY LAPSCP

## (undated) DEVICE — GOWN,SIRUS,FABRNF,XL,20/CS: Brand: MEDLINE

## (undated) DEVICE — ORISE PROKNIFE 3.0 MM ELECTRODE: Brand: ORISE™ PROKNIFE

## (undated) DEVICE — GLOVE ORANGE PI 7 1/2   MSG9075

## (undated) DEVICE — SUTURE VICRYL + SZ 0 L27IN ABSRB VLT L26MM UR-6 5/8 CIR VCP603H

## (undated) DEVICE — TROCARS: Brand: KII® OPTICAL ACCESS SYSTEM

## (undated) DEVICE — STAPLES INT L60MM M THCK REINF INTELLIGENT RELD FOR SIGNIA

## (undated) DEVICE — LIQUIBAND RAPID ADHESIVE 36/CS 0.8ML: Brand: MEDLINE

## (undated) DEVICE — DEVICE SUT FOR TRCR SITE INCIS ENDO CLOSE

## (undated) DEVICE — RELOAD STPL L60MM EXTRA THCK REINF FOR SIGNIA STPLR

## (undated) DEVICE — SUTURE MONOCRYL + SZ 4-0 L27IN ABSRB UD L19MM PS-2 3/8 CIR MCP426H

## (undated) DEVICE — GARMENT,MEDLINE,DVT,INT,CALF,MED, GEN2: Brand: MEDLINE

## (undated) DEVICE — SYRINGE MED 10ML LUERLOCK TIP W/O SFTY DISP

## (undated) DEVICE — CLICKLINE SCISSORS INSERT: Brand: CLICKLINE

## (undated) DEVICE — LINE SAMP LNG AD ORAL NSL PT O2 TBNG SMRT CAPNOLN H +

## (undated) DEVICE — COVER LT HNDL PLAS RIG 1 PER PK

## (undated) DEVICE — X-RAY DETECTABLE SPONGES,16 PLY: Brand: VISTEC

## (undated) DEVICE — SHELL STPL PWR FOR SIGNIA HNDL STPL SYS

## (undated) DEVICE — RELOAD STPL L45MM M THCK REINF FOR SIGNIA STPLR TRI-STPL

## (undated) DEVICE — GLOVE SURG SZ 8 L12IN FNGR THK79MIL GRN LTX FREE